# Patient Record
Sex: MALE | Race: BLACK OR AFRICAN AMERICAN | NOT HISPANIC OR LATINO | URBAN - METROPOLITAN AREA
[De-identification: names, ages, dates, MRNs, and addresses within clinical notes are randomized per-mention and may not be internally consistent; named-entity substitution may affect disease eponyms.]

---

## 2019-01-11 ENCOUNTER — AMBULATORY - HEALTHEAST (OUTPATIENT)
Dept: ADMINISTRATIVE | Facility: CLINIC | Age: 59
End: 2019-01-11

## 2019-01-11 RX ORDER — BACLOFEN 20 MG/1
20 TABLET ORAL 3 TIMES DAILY
Status: SHIPPED | COMMUNITY
Start: 2019-01-11

## 2019-01-11 RX ORDER — CITALOPRAM HYDROBROMIDE 10 MG/1
10 TABLET ORAL DAILY
Status: SHIPPED | COMMUNITY
Start: 2019-01-11

## 2019-01-11 RX ORDER — BROMOCRIPTINE MESYLATE 2.5 MG/1
2.5 TABLET ORAL 2 TIMES DAILY
Status: SHIPPED | COMMUNITY
Start: 2019-01-11

## 2019-01-11 RX ORDER — ASPIRIN 81 MG/1
81 TABLET, CHEWABLE ORAL DAILY
Status: SHIPPED | COMMUNITY
Start: 2019-01-11

## 2019-01-11 RX ORDER — ATORVASTATIN CALCIUM 40 MG/1
40 TABLET, FILM COATED ORAL AT BEDTIME
Status: SHIPPED | COMMUNITY
Start: 2019-01-11

## 2019-01-11 RX ORDER — DONEPEZIL HYDROCHLORIDE 5 MG/1
5 TABLET, FILM COATED ORAL DAILY
Status: SHIPPED | COMMUNITY
Start: 2019-01-11

## 2019-01-15 ENCOUNTER — OFFICE VISIT - HEALTHEAST (OUTPATIENT)
Dept: GERIATRICS | Facility: CLINIC | Age: 59
End: 2019-01-15

## 2019-01-15 DIAGNOSIS — I62.9 INTRACRANIAL HEMORRHAGE (H): ICD-10-CM

## 2019-01-15 DIAGNOSIS — R53.81 PHYSICAL DECONDITIONING: ICD-10-CM

## 2019-01-15 DIAGNOSIS — R13.10 DYSPHAGIA, UNSPECIFIED TYPE: ICD-10-CM

## 2019-01-15 DIAGNOSIS — R57.0 CARDIOGENIC SHOCK (H): ICD-10-CM

## 2019-01-15 DIAGNOSIS — R47.01 APHASIA: ICD-10-CM

## 2019-01-15 DIAGNOSIS — Z71.89 ADVANCED CARE PLANNING/COUNSELING DISCUSSION: ICD-10-CM

## 2019-01-15 DIAGNOSIS — G81.94 LEFT HEMIPARESIS (H): ICD-10-CM

## 2019-01-15 DIAGNOSIS — I63.9 CEREBROVASCULAR ACCIDENT (CVA), UNSPECIFIED MECHANISM (H): ICD-10-CM

## 2019-01-17 ENCOUNTER — OFFICE VISIT - HEALTHEAST (OUTPATIENT)
Dept: GERIATRICS | Facility: CLINIC | Age: 59
End: 2019-01-17

## 2019-01-17 DIAGNOSIS — I62.9 INTRACRANIAL HEMORRHAGE (H): ICD-10-CM

## 2019-01-17 DIAGNOSIS — G81.94 LEFT HEMIPARESIS (H): ICD-10-CM

## 2019-01-17 DIAGNOSIS — R57.0 CARDIOGENIC SHOCK (H): ICD-10-CM

## 2019-01-17 DIAGNOSIS — I25.10 CORONARY ARTERY DISEASE INVOLVING NATIVE HEART, ANGINA PRESENCE UNSPECIFIED, UNSPECIFIED VESSEL OR LESION TYPE: ICD-10-CM

## 2019-01-17 DIAGNOSIS — R53.81 PHYSICAL DECONDITIONING: ICD-10-CM

## 2019-01-17 DIAGNOSIS — R13.10 DYSPHAGIA, UNSPECIFIED TYPE: ICD-10-CM

## 2019-01-17 DIAGNOSIS — I63.9 CEREBROVASCULAR ACCIDENT (CVA), UNSPECIFIED MECHANISM (H): ICD-10-CM

## 2019-01-17 DIAGNOSIS — R47.01 APHASIA: ICD-10-CM

## 2019-01-21 ENCOUNTER — OFFICE VISIT - HEALTHEAST (OUTPATIENT)
Dept: GERIATRICS | Facility: CLINIC | Age: 59
End: 2019-01-21

## 2019-01-21 DIAGNOSIS — I62.9 INTRACRANIAL HEMORRHAGE (H): ICD-10-CM

## 2019-01-21 DIAGNOSIS — R53.81 PHYSICAL DECONDITIONING: ICD-10-CM

## 2019-01-21 DIAGNOSIS — R47.01 APHASIA: ICD-10-CM

## 2019-01-21 DIAGNOSIS — I63.9 CEREBROVASCULAR ACCIDENT (CVA), UNSPECIFIED MECHANISM (H): ICD-10-CM

## 2019-01-21 DIAGNOSIS — R13.10 DYSPHAGIA, UNSPECIFIED TYPE: ICD-10-CM

## 2019-01-21 DIAGNOSIS — G81.94 LEFT HEMIPARESIS (H): ICD-10-CM

## 2019-01-25 ENCOUNTER — OFFICE VISIT - HEALTHEAST (OUTPATIENT)
Dept: GERIATRICS | Facility: CLINIC | Age: 59
End: 2019-01-25

## 2019-01-25 DIAGNOSIS — I63.9 CEREBROVASCULAR ACCIDENT (CVA), UNSPECIFIED MECHANISM (H): ICD-10-CM

## 2019-01-25 DIAGNOSIS — G81.94 LEFT HEMIPARESIS (H): ICD-10-CM

## 2019-01-25 DIAGNOSIS — R47.01 APHASIA: ICD-10-CM

## 2019-01-25 DIAGNOSIS — R13.10 DYSPHAGIA, UNSPECIFIED TYPE: ICD-10-CM

## 2019-01-25 DIAGNOSIS — I62.9 INTRACRANIAL HEMORRHAGE (H): ICD-10-CM

## 2019-01-25 DIAGNOSIS — R53.81 PHYSICAL DECONDITIONING: ICD-10-CM

## 2019-02-01 ENCOUNTER — OFFICE VISIT - HEALTHEAST (OUTPATIENT)
Dept: GERIATRICS | Facility: CLINIC | Age: 59
End: 2019-02-01

## 2019-02-01 DIAGNOSIS — R47.01 APHASIA: ICD-10-CM

## 2019-02-01 DIAGNOSIS — R13.10 DYSPHAGIA, UNSPECIFIED TYPE: ICD-10-CM

## 2019-02-01 DIAGNOSIS — I63.9 CEREBROVASCULAR ACCIDENT (CVA), UNSPECIFIED MECHANISM (H): ICD-10-CM

## 2019-02-01 DIAGNOSIS — I62.9 INTRACRANIAL HEMORRHAGE (H): ICD-10-CM

## 2019-02-01 DIAGNOSIS — G81.94 LEFT HEMIPARESIS (H): ICD-10-CM

## 2019-02-01 DIAGNOSIS — R53.81 PHYSICAL DECONDITIONING: ICD-10-CM

## 2019-02-06 ENCOUNTER — OFFICE VISIT - HEALTHEAST (OUTPATIENT)
Dept: GERIATRICS | Facility: CLINIC | Age: 59
End: 2019-02-06

## 2019-02-06 DIAGNOSIS — I63.9 CEREBROVASCULAR ACCIDENT (CVA), UNSPECIFIED MECHANISM (H): ICD-10-CM

## 2019-02-06 DIAGNOSIS — R47.01 APHASIA: ICD-10-CM

## 2019-02-06 DIAGNOSIS — R13.10 DYSPHAGIA, UNSPECIFIED TYPE: ICD-10-CM

## 2019-02-06 DIAGNOSIS — R53.81 PHYSICAL DECONDITIONING: ICD-10-CM

## 2019-02-06 DIAGNOSIS — G81.94 LEFT HEMIPARESIS (H): ICD-10-CM

## 2019-02-11 ENCOUNTER — OFFICE VISIT - HEALTHEAST (OUTPATIENT)
Dept: GERIATRICS | Facility: CLINIC | Age: 59
End: 2019-02-11

## 2019-02-11 DIAGNOSIS — R53.81 PHYSICAL DECONDITIONING: ICD-10-CM

## 2019-02-11 DIAGNOSIS — I63.9 CEREBROVASCULAR ACCIDENT (CVA), UNSPECIFIED MECHANISM (H): ICD-10-CM

## 2019-02-11 DIAGNOSIS — R13.10 DYSPHAGIA, UNSPECIFIED TYPE: ICD-10-CM

## 2019-02-11 DIAGNOSIS — I62.9 INTRACRANIAL HEMORRHAGE (H): ICD-10-CM

## 2019-02-11 DIAGNOSIS — G81.94 LEFT HEMIPARESIS (H): ICD-10-CM

## 2019-02-11 DIAGNOSIS — R47.01 APHASIA: ICD-10-CM

## 2019-02-22 ENCOUNTER — OFFICE VISIT - HEALTHEAST (OUTPATIENT)
Dept: GERIATRICS | Facility: CLINIC | Age: 59
End: 2019-02-22

## 2019-02-22 DIAGNOSIS — R13.10 DYSPHAGIA, UNSPECIFIED TYPE: ICD-10-CM

## 2019-02-22 DIAGNOSIS — R47.01 APHASIA: ICD-10-CM

## 2019-02-22 DIAGNOSIS — G81.94 LEFT HEMIPARESIS (H): ICD-10-CM

## 2019-02-22 DIAGNOSIS — I62.9 INTRACRANIAL HEMORRHAGE (H): ICD-10-CM

## 2019-02-22 DIAGNOSIS — R53.81 PHYSICAL DECONDITIONING: ICD-10-CM

## 2019-02-22 DIAGNOSIS — I63.9 CEREBROVASCULAR ACCIDENT (CVA), UNSPECIFIED MECHANISM (H): ICD-10-CM

## 2019-02-27 ENCOUNTER — OFFICE VISIT - HEALTHEAST (OUTPATIENT)
Dept: GERIATRICS | Facility: CLINIC | Age: 59
End: 2019-02-27

## 2019-02-27 DIAGNOSIS — I62.9 INTRACRANIAL HEMORRHAGE (H): ICD-10-CM

## 2019-02-27 DIAGNOSIS — R53.81 PHYSICAL DECONDITIONING: ICD-10-CM

## 2019-02-27 DIAGNOSIS — R13.10 DYSPHAGIA, UNSPECIFIED TYPE: ICD-10-CM

## 2019-02-27 DIAGNOSIS — G81.94 LEFT HEMIPARESIS (H): ICD-10-CM

## 2019-03-05 ENCOUNTER — OFFICE VISIT - HEALTHEAST (OUTPATIENT)
Dept: GERIATRICS | Facility: CLINIC | Age: 59
End: 2019-03-05

## 2019-03-05 DIAGNOSIS — G81.94 LEFT HEMIPARESIS (H): ICD-10-CM

## 2019-03-05 DIAGNOSIS — R47.01 APHASIA: ICD-10-CM

## 2019-03-05 DIAGNOSIS — I63.9 CEREBROVASCULAR ACCIDENT (CVA), UNSPECIFIED MECHANISM (H): ICD-10-CM

## 2019-03-05 DIAGNOSIS — R13.10 DYSPHAGIA, UNSPECIFIED TYPE: ICD-10-CM

## 2019-03-12 ENCOUNTER — OFFICE VISIT - HEALTHEAST (OUTPATIENT)
Dept: GERIATRICS | Facility: CLINIC | Age: 59
End: 2019-03-12

## 2019-03-12 DIAGNOSIS — R53.81 PHYSICAL DECONDITIONING: ICD-10-CM

## 2019-03-12 DIAGNOSIS — G81.94 LEFT HEMIPARESIS (H): ICD-10-CM

## 2019-03-12 DIAGNOSIS — R47.01 APHASIA: ICD-10-CM

## 2019-03-12 DIAGNOSIS — I63.9 CEREBROVASCULAR ACCIDENT (CVA), UNSPECIFIED MECHANISM (H): ICD-10-CM

## 2019-03-12 DIAGNOSIS — R13.10 DYSPHAGIA, UNSPECIFIED TYPE: ICD-10-CM

## 2019-03-20 ENCOUNTER — OFFICE VISIT - HEALTHEAST (OUTPATIENT)
Dept: GERIATRICS | Facility: CLINIC | Age: 59
End: 2019-03-20

## 2019-03-20 DIAGNOSIS — I63.9 CEREBROVASCULAR ACCIDENT (CVA), UNSPECIFIED MECHANISM (H): ICD-10-CM

## 2019-03-20 DIAGNOSIS — I62.9 INTRACRANIAL HEMORRHAGE (H): ICD-10-CM

## 2019-03-20 DIAGNOSIS — R47.01 APHASIA: ICD-10-CM

## 2019-03-20 DIAGNOSIS — R53.81 PHYSICAL DECONDITIONING: ICD-10-CM

## 2019-03-20 DIAGNOSIS — R13.10 DYSPHAGIA, UNSPECIFIED TYPE: ICD-10-CM

## 2019-03-20 DIAGNOSIS — G81.94 LEFT HEMIPARESIS (H): ICD-10-CM

## 2019-03-20 DIAGNOSIS — R57.0 CARDIOGENIC SHOCK (H): ICD-10-CM

## 2019-03-25 ENCOUNTER — OFFICE VISIT - HEALTHEAST (OUTPATIENT)
Dept: GERIATRICS | Facility: CLINIC | Age: 59
End: 2019-03-25

## 2019-03-25 DIAGNOSIS — R13.19 OTHER DYSPHAGIA: ICD-10-CM

## 2019-03-25 DIAGNOSIS — I25.119 CORONARY ARTERY DISEASE INVOLVING NATIVE CORONARY ARTERY OF NATIVE HEART WITH ANGINA PECTORIS (H): ICD-10-CM

## 2019-03-25 DIAGNOSIS — G81.94 LEFT HEMIPARESIS (H): ICD-10-CM

## 2019-03-25 DIAGNOSIS — I21.21 STEMI INVOLVING LEFT CIRCUMFLEX CORONARY ARTERY (H): ICD-10-CM

## 2019-03-25 DIAGNOSIS — I63.9 CEREBROVASCULAR ACCIDENT (CVA), UNSPECIFIED MECHANISM (H): ICD-10-CM

## 2019-04-01 ENCOUNTER — OFFICE VISIT - HEALTHEAST (OUTPATIENT)
Dept: GERIATRICS | Facility: CLINIC | Age: 59
End: 2019-04-01

## 2019-04-01 DIAGNOSIS — I21.21 STEMI INVOLVING LEFT CIRCUMFLEX CORONARY ARTERY (H): ICD-10-CM

## 2019-04-01 DIAGNOSIS — R13.19 OTHER DYSPHAGIA: ICD-10-CM

## 2019-04-01 DIAGNOSIS — G81.94 LEFT HEMIPARESIS (H): ICD-10-CM

## 2019-04-01 DIAGNOSIS — I63.9 CEREBROVASCULAR ACCIDENT (CVA), UNSPECIFIED MECHANISM (H): ICD-10-CM

## 2019-04-01 DIAGNOSIS — I25.119 CORONARY ARTERY DISEASE INVOLVING NATIVE CORONARY ARTERY OF NATIVE HEART WITH ANGINA PECTORIS (H): ICD-10-CM

## 2019-04-08 ENCOUNTER — OFFICE VISIT - HEALTHEAST (OUTPATIENT)
Dept: GERIATRICS | Facility: CLINIC | Age: 59
End: 2019-04-08

## 2019-04-08 DIAGNOSIS — I21.21 STEMI INVOLVING LEFT CIRCUMFLEX CORONARY ARTERY (H): ICD-10-CM

## 2019-04-08 DIAGNOSIS — G81.94 LEFT HEMIPARESIS (H): ICD-10-CM

## 2019-04-08 DIAGNOSIS — R13.19 OTHER DYSPHAGIA: ICD-10-CM

## 2019-04-08 DIAGNOSIS — I63.9 CEREBROVASCULAR ACCIDENT (CVA), UNSPECIFIED MECHANISM (H): ICD-10-CM

## 2019-04-13 ENCOUNTER — TRANSFERRED RECORDS (OUTPATIENT)
Dept: HEALTH INFORMATION MANAGEMENT | Facility: CLINIC | Age: 59
End: 2019-04-13

## 2019-04-15 ENCOUNTER — OFFICE VISIT - HEALTHEAST (OUTPATIENT)
Dept: GERIATRICS | Facility: CLINIC | Age: 59
End: 2019-04-15

## 2019-04-15 DIAGNOSIS — I63.9 CEREBROVASCULAR ACCIDENT (CVA), UNSPECIFIED MECHANISM (H): ICD-10-CM

## 2019-04-15 DIAGNOSIS — I21.21 STEMI INVOLVING LEFT CIRCUMFLEX CORONARY ARTERY (H): ICD-10-CM

## 2019-04-15 DIAGNOSIS — I25.119 CORONARY ARTERY DISEASE INVOLVING NATIVE CORONARY ARTERY OF NATIVE HEART WITH ANGINA PECTORIS (H): ICD-10-CM

## 2019-04-15 DIAGNOSIS — R53.81 PHYSICAL DECONDITIONING: ICD-10-CM

## 2019-04-15 DIAGNOSIS — R47.01 APHASIA: ICD-10-CM

## 2019-04-15 DIAGNOSIS — R56.9 SEIZURE (H): ICD-10-CM

## 2019-04-15 DIAGNOSIS — G81.94 LEFT HEMIPARESIS (H): ICD-10-CM

## 2019-04-22 ENCOUNTER — AMBULATORY - HEALTHEAST (OUTPATIENT)
Dept: GERIATRICS | Facility: CLINIC | Age: 59
End: 2019-04-22

## 2021-05-27 NOTE — PROGRESS NOTES
[facility]    Facility:   [facility] [[id number]]     Code Status: FULL CODE   [facility] 4/13 through 4/14/19      CHIEF COMPLAINT/REASON FOR VISIT:  Chief Complaint   Patient presents with     H & P     readmission after possible seizure   Ayo had been in rehabilitation at [facility], following a new stroke, dense left hemiparesis which followed ST elevated MI and drug-eluting stent placement..  Had been on keppra but stopped in Novemeber upon dc from [facility] 2/2 no seizure activity.     Staff at the TCU noted some tonic clonic activity, he was sent to [facility] for evaluation.  Was seen by Dr. Swenson of [place] epilepsy: She recommended Keppra to 50 mg twice daily  EEG monitoring: no seizures but abnormal background    He was sent back to [facility], which coincided with his plan imminent discharge out of the facility, with family providing the care, durable medical equipment ordered previous to his brief admission at [place].    NOTES FROM HISTORY of ADMISSION TO [place]:      HPI: Osvaldo is a 58 y.o. male with a history of stroke with left hemiparesis, intracranial hemorrhage, aphasia, dysphasia, acute respiratory failure, coronary artery disease with cardiogenic shock,.    He had had a stroke in 2011 with residual deficits, infero-lateral STEMI November 2018 with PCI x2, intra-aortic balloon pump for cardiogenic shock, and then a stroke during the cardiac procedure.  He had a thrombectomy of the left middle cerebral artery.  The had subsequent right middle cerebral artery stroke dense left hemiplegia.    He was at [facility].  He had some improvement in right upper extremity function, has some ability to use a spelling board, and handwriting although it is not particularly legible.  N.p.o. with feedings per tube    Transferred to [facility]. Per Nursing and Social Service, family members wants to take him home and will  take turns being with him.    Past Medical History:   Diagnosis Date     Acute respiratory failure with hypoxia (H)      Aphasia      Aspiration pneumonia (H)      CAD (coronary artery disease)      Cardiogenic shock (H)      CVA (cerebral vascular accident) (H)      Dysphagia      HLD (hyperlipidemia)      Hyperglycemia      Hypotension      Intracranial hemorrhage (H)      Late effects of cerebral ischemic stroke      Left hemiparesis (H)      Stroke (H)                    Review of Systems   Nonverbal, did smile.  No attempt at gestures, did write on the white board but did not answers questions; kept writing his age and birth year    Blood pressure 98/73, pulse 71, temperature 97.2  F (36.2  C), resp. rate 16, SpO2 97 %.        Physical Exam  Constitutional: alert, middle-aged -American male nonverbal  HEENT: normal  Lungs:  clear to auscultation bilaterally,   Heart: S1S2 regular rate and rhythm, no murmur  Abdomen: soft, non-tender; bowel sounds normal; no masses,  no organomegaly, g-tube in place, site without inflammation or discharge   Extremities: extremities normal, brace on left arm, no  Edema. No spontaneous movement left side extremities  Skin:  No rashes or lesions  Neurologic: nonverbal, left sided hemiplegia, aphasia  Psych: pleasant     Transfers with max assist of 2, maximum assistance in all ADLs.      LABS:   04/13/19 1944   WBC 12.7 H   HGB 14.4   MCV 87      INR 1.0   Recent Labs   04/13/19 1944   SODIUM 139   POTASSIUM 4.3   OC1ADZPM 24   BUN 15   CREATININE 0.74   GLUCOSE 89         Head CT:  1. No acute intracranial abnormality.        ASSESSMENT / PLAN :        ICD-10-CM    1. Seizure (H) R56.9 On Keppra, will need LFT, BMP, CBC monitoring.   2. Cerebrovascular accident (CVA), unspecified mechanism (H): lifetime 3 CVAs I63.9 Total Care: sister feels she can take care of him with help from other family members, hospital bed, wheelchair, transfer disc per her requests.. On   Parlodel, donepezil for . Baclofen for spasm   3. Coronary artery disease involving native coronary artery of native heart with angina pectoris (H) I25.119 No indication of angina   4. STEMI involving left circumflex coronary artery (H): TESSA I21.21 On Brilinta, ASA   5. Left hemiparesis (H) G81.94 plateaued in thrapy.   6. Dysphasia R13.0 Nutrition per PEG tube with Isosource 1.5 PHILIPPE at 56 mL/hr continuous feedings   7. Physical deconditioning R53.81 Poor condition; requires assist of 2     Discharge face to face:  4/15/19  These are the necessary home care services and their rationale:  1.  Home health care nurse: His stroke left him with severe dysphasia, he is tube feed dependent.  Nurse for teaching and management  2.  Home health aide: He is dependent in all ADLs, they can provide personal cares and bath aid  3.  Home physical therapy: He has left hemiplegia and requires the assistance of 2.  Treat to advance functional mobility as well as fall risk assessment and home safety evaluation.  4.  Home occupational therapy, again he has both cognitive and physical challenges with a total of 3 strokes, need to treat to advance ADL mobility to his maximum potential.  Home speech therapy: Treat to advance swallowing as he has severe dysphasia  : To assist with community resources.    Special instructions: 1.   clean the G-tube site daily with warm  soapy water, pat dry and cover with gauze.  2.  Elbow splint on in the morning for 12 hours and remove and evening/bedtime: To treat and prevent further arm contracture 3.  Left hand splint on at bedtime for 12 hours, remove in the morning for treatment and prevention of further hand contracture.    He is to follow-up with his PCP within 5 days of discharge: Recommend that CBC and basic metabolic profile be checked, for when it safe to restart his beta-blocker, ACE inhibitor, and statin.    Time 40 minutes: Reviewing most recent hospitalization, exam, and  preparing discharge orders consisting of med reconciliation, and aftercare services.  Electronically signed by: Lesley Schwarz MD

## 2021-05-27 NOTE — PROGRESS NOTES
[facility]    Facility:   [facility] [[id number]]     Code Status: FULL CODE      CHIEF COMPLAINT/REASON FOR VISIT:  Chief Complaint   Patient presents with     Review Of Multiple Medical Conditions     CVA       HISTORY:      HPI: Osvaldo is a 58 y.o. male with a history of stroke with left hemiparesis, intracranial hemorrhage, aphasia, dysphasia, acute respiratory failure, coronary artery disease with cardiogenic shock,.    He had had a stroke in 2011 with residual deficits, infero-lateral STEMI November 2018 with PCI x2, intra-aortic balloon pump for cardiogenic shock, and then a stroke during the cardiac procedure.  He had a thrombectomy of the left middle cerebral artery.  The had subsequent right middle cerebral artery stroke dense left hemiplegia.    He was at [facility].  He had some improvement in right upper extremity function, has some ability to use a spelling board, and handwriting although it is not particularly legible.  N.p.o. with feedings per tube    Transferred to [facility]. Per Nursing and Social Service, family members wants to take him home and will take turns being with him.    Past Medical History:   Diagnosis Date     Acute respiratory failure with hypoxia (H)      Aphasia      Aspiration pneumonia (H)      CAD (coronary artery disease)      Cardiogenic shock (H)      CVA (cerebral vascular accident) (H)      Dysphagia      HLD (hyperlipidemia)      Hyperglycemia      Hypotension      Intracranial hemorrhage (H)      Late effects of cerebral ischemic stroke      Left hemiparesis (H)      Stroke (H)                    Review of Systems   Nonverbal, did smile.  No attempt at gestures, no use of word board    Blood pressure 141/80, pulse 80, temperature 97.6  F (36.4  C), resp. rate 18, SpO2 97 %.      Physical Exam  Constitutional: alert, middle-aged -American male nonverbal  Membranes moist  Lungs: poor inspiratory effort,  clear to auscultation bilaterally,   Heart: S1S2 regular rate and rhythm  Abdomen: soft, non-tender; bowel sounds normal; no masses,  no organomegaly, g-tube in place, site without inflammation or discharge   Extremities: extremities normal, brace on left arm, no  edema  Skin:  No rashes or lesions  Neurologic: nonverbal, left sided hemiplegia, aphasia  Psych: pleasant    Per therapy update: Transfers with max assist of 2, maximum assistance in all ADLs.  He has been more resistant lately to the therapy.  He is not making significant progress.  100 th day will be on April 10    LABS:   No new labs    ASSESSMENT / PLAN :      ICD-10-CM    1. Cerebrovascular accident (CVA), unspecified mechanism (H) ( three CVAs) I63.9 Family may try caring but he is heavy care, totally dependent for all ADLS; would usually expect LTC placement. Safe dc plan must be assured.   2. Coronary artery disease involving native coronary artery of native heart with angina pectoris (H) I25.119 Not showing signs symptoms of ischemia, continue to monitor   3. STEMI involving left circumflex coronary artery (H): TESSA I21.21    4. Left hemiparesis G81.94 Will likely  not recover use of left side   5 Other dysphagia: NPO, feeding tube R13.19 Working well.         Electronically signed by: Lesley Schwarz MD

## 2021-05-27 NOTE — PROGRESS NOTES
[facility]    Facility:   [facility] [[id number]]     Code Status: FULL CODE      CHIEF COMPLAINT/REASON FOR VISIT:  Chief Complaint   Patient presents with     Review Of Multiple Medical Conditions     CVA / face to face for wheelchair and hoapital bed       HISTORY:      HPI: Osvaldo is a 58 y.o. male with a history of stroke with left hemiparesis, intracranial hemorrhage, aphasia, dysphasia, acute respiratory failure, coronary artery disease with cardiogenic shock,.    He had had a stroke in 2011 with residual deficits, infero-lateral STEMI November 2018 with PCI x2, intra-aortic balloon pump for cardiogenic shock, and then a stroke during the cardiac procedure.  He had a thrombectomy of the left middle cerebral artery.  The had subsequent right middle cerebral artery stroke dense left hemiplegia.    He was at [facility].  He had some improvement in right upper extremity function, has some ability to write on a white board.N.p.o. with feedings per tube    Transferred to [facility]. Per Nursing and Social Service, family members wants to take him home and will take turns being with him.    UPDATE:  Therapy has been working with him to roll side-to-side in the bed.  On 3/31 on the evening shift, he rolled his head and torso out of bed.  He was found with his head on the bed, his legs were still on the mattress.  He stated he was trying to rollover.  His sister was upset, wanted to know how it happened.  I discussed with her that when we order the hospital bed we could order more of a railing than the cane rail used in TCU.    Face-to-face is done today for an electric hospital bed, and a standard manual wheelchair (see below).  Sister is upset that therapy says he does not need a Giudi chair, he intends to call [employer] his insurance company herself to lobby for getting them to pay for one.  We informed her that we can order equipment that is not justified,  that it would constitute fraud.      Past Medical History:   Diagnosis Date     Acute respiratory failure with hypoxia (H)      Aphasia      Aspiration pneumonia (H)      CAD (coronary artery disease)      Cardiogenic shock (H)      CVA (cerebral vascular accident) (H)      Dysphagia      HLD (hyperlipidemia)      Hyperglycemia      Hypotension      Intracranial hemorrhage (H)      Late effects of cerebral ischemic stroke      Left hemiparesis (H)      Stroke (H)                    Review of Systems   Nonverbal, did smile.    Did very well writing on the white board today  He was less engaged when his sister came and was upset about the DME and fall    Blood pressure 96/66, pulse 61, temperature 96.6  F (35.9  C), resp. rate 16, SpO2 95 %.          Physical Exam  Constitutional: alert, middle-aged -American male nonverbal  Membranes moist  Lungs: poor inspiratory effort, clear to auscultation bilaterally,   Heart: S1S2 regular rate and rhythm  Abdomen: soft, non-tender; bowel sounds normal; no masses,  no organomegaly, g-tube in place, site without inflammation or discharge   Extremities: extremities normal, brace on left arm, no  edema  Skin:  No rashes or lesions  Neurologic: nonverbal, left sided hemiplegia, aphasia, drooling. Articulated brace on left arm  Psych: pleasant    Per therapy update: Transfers with max assist of 2, maximum assistance in all ADLs.  He has been more resistant lately to the therapy.  He is not making significant progress.  100 th day will be on April 10    LABS:   No new labs    ASSESSMENT / PLAN :      ICD-10-CM    1. Cerebrovascular accident (CVA), unspecified mechanism (H) ( three CVAs) I63.9 Family may try caring but he is heavy care, totally dependent for all ADLS; would usually expect LTC placement. Safe dc plan must be assured.Aricept, Parlodel   2. Coronary artery disease involving native coronary artery of native heart with angina pectoris (H) I25.119 Not showing signs  symptoms of ischemia, continue to monitor   3. STEMI involving left circumflex coronary artery (H): TESSA I21.21 ASA, Lipitor, Brilinta (aslso for CVAs)   4. Left hemiparesis G81.94 Will likely  not recover use of left side. Baclofen for spasm.   5 Other dysphagia: NPO, feeding tube R13.19 Working well.       FACE TO FACE  4/1/2019    [facility] BED  Diagnosis: CVA with left hemiparesis, weakness, aspiration pneumonia  Height 72 inches, weight 136.6 pounds  1.  Patient has a medical condition that requires positioning not feasible with an ordinary bed.  2.  He requires positioning of the body to prevent pain in ways not feasible with an ordinary bed.  3.  Patient requires a head of the bed to be elevated more than 30 degrees due to aspiration problems and tube feedings.  Cannot hold his trunk on pillows or wedges.  4: He does not require traction equipment  5.  He requires a bed height different than fixed height hospital bed for transfers,  6.  He requires frequent body changes to prevent pressure sores.  7.  He needs halflength siderails, as he is rolled himself out of bed.  Estimated length of need: Lifetime (99 months).    FACE TO FACE    4/1/2019  Standard manual wheel chair    Diagnosis: CVA with left hemiparesis, weakness  Height 72 inches, weight 136.6 pounds  Osvaldo will use the wheelchair 8+ hours per day, all times when out of bed  2.  Wheelchair will be used within his home  3.  He can propel the chair with his right arm and leg  4.  He does not have skin breakdown but is at risk for breakdown due to immobility.  Pressure relief cushion will be ordered  5.  He is not receiving any advanced wound healing therapy.    Wheelchair specifics: 18 inch wide, 18 inch depth, full armrests with nylon armrest bolster on the left side, chair height high position, elevating leg rests, full reclining high back with head rest extension, rear anti-tippers.  Equal gel pressure relief cushion or similar (18 inch x 18  inch)    Wheelchair prescription:  1: Patient has mobility restrictions that significantly impair his ability to participate in MR ADLs such as toileting, feeding, dressing, grooming, and bathing.  2: His mobility limitations cannot be resolved by the use of a fitted cane or walker  3 use of a manual wheelchair will significantly improve his ability to participate in MR ADLs and he will use it on a regular basis at home.  4: He is nonverbal, so cannot express willingness to use the manual wheelchair in the home.  Family caretakers needed and he is able to use it  5: He has upper extremity function and capabilities on the right side with supervision  6: He has a caregiver available, willing, and able to provide assistance  7: We are not ordering mesfin-wheelchair 8.  We are not ordering a light weight wheelchair 9.  We are not ordering a high strength lightweight wheelchair.  Estimated length of need lifetime (99 months)      He also requires of pivot transfer disc 15 inch    Total time 40 minutes, > 50 % with pt and sister explaining DME orders, regarding fall. Also including 10 minutes with Social Service and sister regarding sister's request for DME that is not indicated by therapy, and discussion with unit RN manager about the DME issues.    Electronically signed by: Lesley Schwarz MD   NPI: [id number]

## 2021-06-02 VITALS — WEIGHT: 135.2 LBS

## 2021-06-02 VITALS — WEIGHT: 137 LBS

## 2021-06-02 VITALS — WEIGHT: 136 LBS

## 2021-06-02 VITALS — WEIGHT: 135.8 LBS

## 2021-06-02 VITALS — WEIGHT: 134.4 LBS

## 2021-06-02 VITALS — WEIGHT: 136.6 LBS

## 2021-06-02 VITALS — WEIGHT: 138.2 LBS

## 2021-06-02 VITALS — WEIGHT: 134.2 LBS

## 2021-06-16 PROBLEM — I21.21 STEMI INVOLVING LEFT CIRCUMFLEX CORONARY ARTERY (H): Status: ACTIVE | Noted: 2018-11-21

## 2021-06-16 PROBLEM — R47.01 APHASIA: Status: ACTIVE | Noted: 2019-01-17

## 2021-06-16 PROBLEM — Z71.89 ADVANCED CARE PLANNING/COUNSELING DISCUSSION: Status: ACTIVE | Noted: 2019-01-17

## 2021-06-16 PROBLEM — I62.9 INTRACRANIAL HEMORRHAGE (H): Status: ACTIVE | Noted: 2019-01-17

## 2021-06-16 PROBLEM — I25.119 CORONARY ARTERY DISEASE INVOLVING NATIVE CORONARY ARTERY OF NATIVE HEART WITH ANGINA PECTORIS (H): Status: ACTIVE | Noted: 2019-03-27

## 2021-06-16 PROBLEM — R13.10 DYSPHAGIA: Status: ACTIVE | Noted: 2019-01-17

## 2021-06-16 PROBLEM — I63.9 CVA (CEREBRAL VASCULAR ACCIDENT) (H): Status: ACTIVE | Noted: 2019-01-17

## 2021-06-16 PROBLEM — R53.81 PHYSICAL DECONDITIONING: Status: ACTIVE | Noted: 2019-01-17

## 2021-06-16 PROBLEM — G81.94 LEFT HEMIPARESIS (H): Status: ACTIVE | Noted: 2019-01-17

## 2021-06-16 PROBLEM — R57.0 CARDIOGENIC SHOCK (H): Status: ACTIVE | Noted: 2019-01-17

## 2021-06-18 NOTE — LETTER
"Letter by Heather Aj CNP at      Author: Heather Aj CNP Service: -- Author Type: --    Filed:  Encounter Date: 1/15/2019 Status: (Other)         Patient: Osvaldo Rollins   MR Number: [id number]   YOB: 1960   Date of Visit: 1/15/2019     [facility]    Facility:   [facility] [[id number]]   Code Status: FULL CODE and POLST AVAILABLE      CHIEF COMPLAINT/REASON FOR VISIT:  Chief Complaint   Patient presents with   ? Review Of Multiple Medical Conditions     physical deconditioning, CVA, intracranial hemorrhage, left sided deficitis, aphasia, dysphagia       HISTORY:      HPI: Osvaldo is a 58 y.o. male with complex past medical history including CVA, intracranial hemorrhage, aphasia, dysphagia, acute respiratory failure with hypoxia, CAD, cardiogenic shock, left hemiparesis (other's outlined in pmh table) presenting to [facility] for rehabilitation s/p CVA. He was hospitalized at [facility] on 11/1/2018 for a STEMI and L MCA occlusion, he was discharged on 11/21/2018 and went to [facility] where he was to undergo rehabilitative therapies. Discharging provider from [facility] summarized hospitalization in previous notes.     Summary from [facility] discharging provider  is as follows:   \"58 y.o. patient was admitted to [facility] on 11/21/2018 as a transfer from [facility] for continuation of respiratory and overall rehabilitation after strokes.           Patient has h/o frontal stroke in 2011 with residual deficits; he  initially presented to [facility] on 11/1/18 after suffering chest pain. He was found to have had an acute inferoposterior ST-elevation myocardial infarction.            He was subsequently transferred to [facility] for cardiac catheterization. There, he underwent PCI x2 (TESSA to proximal circumflex and to 1st marginal relieving 100% stenosis in both) and had an intra-aortic balloon pump placed for " cardiogenic shock.            While on the table, he suffered an acute stroke at which point he was transferred to the Neurointerventional suite for emergent thrombectomy of the L MCA. He was then sent to the ICU with the IABP.            He was extubated 2 days later.            However, he then suddenly lost consciousness. It was initially thought that this was due to failure of extubation so he was re-intubated. Later, it was determined that he had new neurologic deficits. A repeat CT of the brain showed a new R MCA stroke. Thrombectomy was not indicated in this situation.            He subsequently developed pulmonary edema and underwent tracheostomy on 11/14.            His neurologic status remained poor in that he would only awaken, open his eyes but not track, could not follow commands, and was able to spontaneously move his right side but almost no movement of the left side. His course was complicated by aspiration pneumonia which was treated with antibiotics but he continued to have fevers on/off despite antibiotics and despite no other source of infection being found.          PEG was placed on 11/19/2018.         He was placed on Keppra for seizure prophylaxis, has since been D/C'd with no evidence of seizures         He was not placed on beta-blockers or ACE inhibitors post-stroke and post-MI because of hypotension. In fact, his neurological symptoms apparently worsened if he became hypotensive so Midodrine was started and neurology at [facility], has since been weaned off with stable BP.  Statin was discontinued at [facility] due to increasing LFT's, LFTs improved after discontinuation.          His LVEF was 45%.           He is on donepezil and Celexa as well, per Neurology at [facility].      Problems at the time of transfer to [facility]:        1. 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  "Acute inferolateral NSTEMI s/p TESSA to L circumflex and D1 s/p cardiogenic shock with IABP now resolved        3. Acute hypoxic respiratory failure s/p tracheostomy on ventilator        4. 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 Hypotension requiring pharmacologic support        6. Dysphagia s/p PEG on tube feeds with NPO status        7. Hyperglycemia related to tube feeds        8. Recent aspiration pneumonia s/p antibiotic treatment with persistent, intermittent fevers        9. Hyperlipidemia      10. Tobacco use disorder      11. Alcohol use disorder    Has made some progress since transfer to [facility] on 11/21/18. Has had improvement to function of his RUE, is able to communicate via handwriting. Was initially on ventilator support, but gradually improved; eventually decannulated 12/18/18.\"    Today Mr. Rollins is being evaluated for a routine review of multiple medical problems while in the TCU, as well as an intake into the TCU. Upon first visit he is alone in his room, he is nonverbal. He does have a notebook and is attempting to write-- however, it is unclear what he is trying to say. He will start to draw circles in the middle of the book. Writing is not legible. This is normal per nursing staff. Nursing staff did say that he has been doing well since having an episode of emesis two days ago (tube feeding was held for a short period and then resumed at a lower rate). No further episodes of emesis or any other concerns. He has been slightly warm but temperature at visit was 99.4. Sister Lee Ann does come in the middle of visit where an indepth discussion of goals of care and advanced care planning ensues. Did discuss that the family's goal is to get him to a point where they can take him home and care for Osvaldo. They feel he has not had enough therapy and would like him to have as much therapy as possible. Sister Lee Ann is a good advocate for her brother and shares " she will be to the facility daily. She would like to see as much improvement as possible. Mr. Rollins remains nonverbal during the entire visit. Does not follow commands. He has been working with PT/OT routinely. Unable to do an ROS with patient due to condition, sister and nursing staff do not have any new concerns or issues to report.         Past Medical History:   Diagnosis Date   ? Acute respiratory failure with hypoxia (H)    ? Aphasia    ? Aspiration pneumonia (H)    ? CAD (coronary artery disease)    ? Cardiogenic shock (H)    ? CVA (cerebral vascular accident) (H)    ? Dysphagia    ? HLD (hyperlipidemia)    ? Hyperglycemia    ? Hypotension    ? Intracranial hemorrhage (H)    ? Late effects of cerebral ischemic stroke    ? Left hemiparesis (H)    ? Stroke (H)              Family History   Problem Relation Age of Onset   ? Deep vein thrombosis Brother    ? Stroke Maternal Grandfather      Social History     Socioeconomic History   ? Marital status: Single     Spouse name: None   ? Number of children: None   ? Years of education: None   ? Highest education level: None   Social Needs   ? Financial resource strain: None   ? Food insecurity - worry: None   ? Food insecurity - inability: None   ? Transportation needs - medical: None   ? Transportation needs - non-medical: None   Occupational History   ? None   Tobacco Use   ? Smoking status: Never Smoker   ? Smokeless tobacco: Never Used   Substance and Sexual Activity   ? Alcohol use: Yes   ? Drug use: None   ? Sexual activity: None   Other Topics Concern   ? None   Social History Narrative   ? None     Current Outpatient Medications   Medication Sig   ? acetaminophen (TYLENOL) 160 mg/5 mL solution 650 mg by G-tube route every 4 (four) hours as needed for fever or pain.          ? aspirin 81 mg chewable tablet 81 mg by G-tube route daily.   ? atorvastatin (LIPITOR) 40 MG tablet 40 mg by G-tube route at bedtime.   ? baclofen (LIORESAL) 20 MG tablet 20 mg by G-tube  route 3 (three) times a day.   ? bromocriptine (PARLODEL) 2.5 mg tablet 2.5 mg by G-tube route 2 (two) times a day.   ? chlorhexidine (PERIDEX) 0.12 % solution Apply 15 mL to the mouth or throat 2 (two) times a day.   ? citalopram (CELEXA) 10 MG tablet 10 mg by G-tube route daily.   ? donepezil (ARICEPT) 5 MG tablet 5 mg by G-tube route daily.   ? emollient base (EMOLLIENT TOP) Apply 2 g topically daily.   ? enoxaparin (LOVENOX) 40 mg/0.4 mL syringe Inject 40 mg under the skin daily.   ? heparin sodium,porcine (HEPARIN, PORCINE,) 5,000 unit/mL injection Inject 5,000 Units under the skin every 8 (eight) hours.   ? insulin glargine (LANTUS) 100 unit/mL injection Inject 8 Units under the skin at bedtime.   ? ipratropium-albuterol (DUO-NEB) 0.5-2.5 mg/3 mL nebulizer Inhale 3 mL 4 (four) times a day as needed.   ? ticagrelor (BRILINTA) 90 mg Tab 90 mg 2 (two) times a day. Via G-tube       REVIEW OF SYSTEM:  Unable to complete due to neurologic status of patient.     PHYSICAL EXAM:   General appearance: alert, appears stated age and cooperative--however nonverbal and does not communicate  HEENT: Head is normocephalic with normal hair distribution. No evidence of trauma. Ears: Without lesions or deformity. No acute purulent discharge. Eyes: Conjunctivae pink with no scleral icterus or erythema. Nose: Normal mucosa and septum. Oropharnyx: mmm, no lesions present.  Lungs: poor inspiratory effort, clear to auscultation bilaterally, respirations without effort  Heart: regular rate and rhythm, S1, S2 normal, no murmur, click, rub or gallop  Abdomen: soft, non-tender; bowel sounds normal; no masses,  no organomegaly, g-tube in place and infusing  Extremities: extremities normal, atraumatic, no cyanosis or edema  Pulses: 2+ and symmetric  Skin: Skin color, texture, turgor normal. No rashes or lesions  Neurologic: nonverbal, left sided hemiplegia, aphasia  Psych: pleasant      LABS:   None today.     ASSESSMENT:      ICD-10-CM     1. Physical deconditioning R53.81    2. Cerebrovascular accident (CVA), unspecified mechanism (H) I63.9    3. Cardiogenic shock (H) R57.0    4. Left hemiparesis (H) G81.94    5. Intracranial hemorrhage (H) I62.9    6. Dysphagia, unspecified type R13.10    7. Aphasia R47.01        PLAN:    Physical Deconditioning due to Cardiogenic Shock, Intracranial Hemorrhage, CVA  -Continue PT/OT and other therapies as per care plan.  -Encouraged good nutrition and movement habits.   -Discussed care plan and expected course of stay.   -Continue to follow-up per routine schedule or sooner if needed.     CVA with Hemiparesis, Dysphagia, Aphasia  -Therapies routinely.   -White board with dry erase marker for communication.   -Communication board.     Dysphagia  -SLP to work with patient--consult, eval & treat.   -Tube feeding, increase to rate of 56 cc/hr continuously.     Advanced Care Planning  -Full Code.   -POLST signed and reviewed.     Admission history and physical per MD per compliance. At this time continue current care plan for all chronic medical conditions, as they are stable. Encouraged patient to engage in PT/OT for strengthening and conditioning. Encouraged patient to work closely with nursing staff to ensure any medical complaints are quickly addressed. Follow up this week or sooner if needed. Will continue to monitor patient and work with nursing staff collaboratively to work toward positive patient outcomes    Total time of 45 minutes spent with patient and nursing staff with greater than 50% of this time spent on review of previous records, counseling, education, and discussion of the above care plan with nursing staff and patient. An additional 20 minutes, from 12:00 pm to 12:20 pm was spent discussing advanced care planning including goals of care and code status. This discussion was held with sister Lee Ann with patient in attendance.     Electronically signed by: Heather Aj CNP

## 2021-06-18 NOTE — LETTER
Letter by Heather Aj CNP at      Author: Heather Aj CNP Service: -- Author Type: --    Filed:  Encounter Date: 1/25/2019 Status: (Other)         Patient: Osvaldo Rollins   MR Number: [id number]   YOB: 1960   Date of Visit: 1/25/2019     [facility]    Facility:   [facility] [[id number]]   Code Status: FULL CODE and POLST AVAILABLE      CHIEF COMPLAINT/REASON FOR VISIT:  Chief Complaint   Patient presents with   ? Review Of Multiple Medical Conditions     physical deconditioning, CVA, HTN, intracerebral hemorrhage       HISTORY:      HPI: Osvaldo is a 58 y.o. male with complex past medical history including CVA, intracranial hemorrhage, aphasia, dysphagia, acute respiratory failure with hypoxia, CAD, cardiogenic shock, left hemiparesis (other's outlined in pmh table) presenting to [facility] for rehabilitation s/p CVA. He was hospitalized at [facility] on 11/1/2018 for a STEMI and L MCA occlusion, he was discharged on 11/21/2018 and went to [facility] where he was to undergo rehabilitative therapies. Discharging provider from [facility] summarized hospitalization in previous notes.  Has made some progress since transfer to [facility] on 11/21/18. Has had improvement to function of his RUE, is able to communicate via handwriting. Was initially on ventilator support, but gradually improved; eventually decannulated 12/18/18.  Hospital stay summarized in previous notes.    Mr. Rollins is being seen today for a review of multiple medical conditions. Osvaldo is resting in his bed. He is again nonverbal but does have a whiteboard which is much easier for him to write on. He does write his name on the whiteboard, as well as his birthday and an address and a phone number. However he is unable to answer any questions asked of him. He does hold my hand and does squeeze it but does not do so on command. Osvaldo does not have any meaningful  communication at this time, however it does seem he is attempting to connect. There are no new concerns from nursing. Sister Lee Ann is not at bedside during this visit.     Past Medical History:   Diagnosis Date   ? Acute respiratory failure with hypoxia (H)    ? Aphasia    ? Aspiration pneumonia (H)    ? CAD (coronary artery disease)    ? Cardiogenic shock (H)    ? CVA (cerebral vascular accident) (H)    ? Dysphagia    ? HLD (hyperlipidemia)    ? Hyperglycemia    ? Hypotension    ? Intracranial hemorrhage (H)    ? Late effects of cerebral ischemic stroke    ? Left hemiparesis (H)    ? Stroke (H)              Family History   Problem Relation Age of Onset   ? Deep vein thrombosis Brother    ? Stroke Maternal Grandfather      Social History     Socioeconomic History   ? Marital status: Single     Spouse name: None   ? Number of children: None   ? Years of education: None   ? Highest education level: None   Social Needs   ? Financial resource strain: None   ? Food insecurity - worry: None   ? Food insecurity - inability: None   ? Transportation needs - medical: None   ? Transportation needs - non-medical: None   Occupational History   ? None   Tobacco Use   ? Smoking status: Never Smoker   ? Smokeless tobacco: Never Used   Substance and Sexual Activity   ? Alcohol use: Yes   ? Drug use: None   ? Sexual activity: None   Other Topics Concern   ? None   Social History Narrative   ? None     Current Outpatient Medications   Medication Sig   ? acetaminophen (TYLENOL) 160 mg/5 mL solution 650 mg by G-tube route every 4 (four) hours as needed for fever or pain.          ? aspirin 81 mg chewable tablet 81 mg by G-tube route daily.   ? atorvastatin (LIPITOR) 40 MG tablet 40 mg by G-tube route at bedtime.   ? baclofen (LIORESAL) 20 MG tablet 20 mg by G-tube route 3 (three) times a day.   ? bromocriptine (PARLODEL) 2.5 mg tablet 2.5 mg by G-tube route 2 (two) times a day.   ? chlorhexidine (PERIDEX) 0.12 % solution Apply 15 mL  to the mouth or throat 2 (two) times a day.   ? citalopram (CELEXA) 10 MG tablet 10 mg by G-tube route daily.   ? donepezil (ARICEPT) 5 MG tablet 5 mg by G-tube route daily.   ? emollient base (EMOLLIENT TOP) Apply 2 g topically daily.   ? enoxaparin (LOVENOX) 40 mg/0.4 mL syringe Inject 40 mg under the skin daily.   ? heparin sodium,porcine (HEPARIN, PORCINE,) 5,000 unit/mL injection Inject 5,000 Units under the skin every 8 (eight) hours.   ? insulin glargine (LANTUS) 100 unit/mL injection Inject 8 Units under the skin at bedtime.   ? ipratropium-albuterol (DUO-NEB) 0.5-2.5 mg/3 mL nebulizer Inhale 3 mL 4 (four) times a day as needed.   ? ticagrelor (BRILINTA) 90 mg Tab 90 mg 2 (two) times a day. Via G-tube       REVIEW OF SYSTEM:  Unable to complete due to neurologic status of patient.     PHYSICAL EXAM:   General appearance: alert, appears stated age and cooperative--however nonverbal and does not communicate  HEENT: Head is normocephalic with normal hair distribution. No evidence of trauma. Ears: Without lesions or deformity. No acute purulent discharge. Eyes: Conjunctivae pink with no scleral icterus or erythema. Nose: Normal mucosa and septum. Oropharnyx: mmm, no lesions present.  Lungs: poor inspiratory effort, clear to auscultation bilaterally, respirations without effort  Heart: regular rate and rhythm, S1, S2 normal, no murmur, click, rub or gallop  Abdomen: soft, non-tender; bowel sounds normal; no masses,  no organomegaly, g-tube in place and infusing  Extremities: extremities normal, atraumatic, no cyanosis or edema  Pulses: 2+ and symmetric  Skin: Skin color, texture, turgor normal. No rashes or lesions  Neurologic: nonverbal, left sided hemiplegia, aphasia, immobile  Psych: pleasant      LABS:   None today.     ASSESSMENT:      ICD-10-CM    1. Physical deconditioning R53.81    2. Left hemiparesis (H) G81.94    3. Intracranial hemorrhage (H) I62.9    4. Dysphagia, unspecified type R13.10    5. Aphasia  R47.01    6. Cerebrovascular accident (CVA), unspecified mechanism (H) I63.9        PLAN:       Physical Deconditioning due to Cardiogenic Shock, Intracranial Hemorrhage, CVA- chronic, stable  -Continue PT/OT and other therapies as per care plan.  -Encouraged good nutrition and mobility as tolerated.  -Discussed care plan and expected course of stay.   -Continue to follow-up per routine schedule or sooner if needed.     CVA with Hemiparesis, Dysphagia, Aphasia- chronic, stable  -PT/OT as directed  -White board with dry erase marker for communication.   -Communication board.     Dysphagia- chronic, stable  -SLP to work with patient--consult, eval & treat.   -Tube feeding.    Otherwise continue current care plan for all other chronic medical conditions, as they are stable. Encouraged patient to engage in healthy lifestyle behaviors such as engaging in social activities, exercising (PT/OT), eating well, and following care plan. Follow up for routine check-up, or sooner if needed. Will continue to monitor patient and work with nursing staff collaboratively to work toward positive patient outcomes.    Electronically signed by: Heather Aj CNP

## 2021-06-18 NOTE — LETTER
Letter by Heather Aj CNP at      Author: Heather Aj CNP Service: -- Author Type: --    Filed:  Encounter Date: 2/27/2019 Status: (Other)         Patient: Osvaldo Rollins   MR Number: [id number]   YOB: 1960   Date of Visit: 2/27/2019     [facility]    Facility:   [facility] [[id number]]   Code Status: FULL CODE and POLST AVAILABLE      CHIEF COMPLAINT/REASON FOR VISIT:  Chief Complaint   Patient presents with   ? Review Of Multiple Medical Conditions     physical deconditioning, CVA, hemiparesis, aphasia, dysphagia       HISTORY:      HPI: Osvaldo is a 58 y.o. male with complex past medical history including CVA, intracranial hemorrhage, aphasia, dysphagia, acute respiratory failure with hypoxia, CAD, cardiogenic shock, left hemiparesis (other's outlined in pmh table) presenting to [facility] for rehabilitation s/p CVA. He was hospitalized at [facility] on 11/1/2018 for a STEMI and L MCA occlusion, he was discharged on 11/21/2018 and went to [facility] where he was to undergo rehabilitative therapies. Discharging provider from [facility] summarized hospitalization in previous notes.  Has made some progress since transfer to [facility] on 11/21/18. Has had improvement to function of his RUE, is able to communicate via handwriting. Was initially on ventilator support, but gradually improved; eventually decannulated 12/18/18.  Hospital stay summarized in previous notes.    Today Osvaldo is being evaluated for a routine review of multiple medical problems. He is doing quite well today and is actually holding a conversation with writer via a whiteboard. He is able to answer simple questions and is able to share what he is feeling. Osvaldo is following commands well. He states he is feeling good and that things are going well. He has been working hard at therapy. He does not have any family with him at this time. Osvaldo denies any other  concerns including fevers/chills, cough or cold symptoms, headaches, vision changes, chest pain/pressure, difficulty breathing, SOB, abdominal pain, nausea, vomiting, diarrhea, dysuria, increasing weakness, increasing pain.       Past Medical History:   Diagnosis Date   ? Acute respiratory failure with hypoxia (H)    ? Aphasia    ? Aspiration pneumonia (H)    ? CAD (coronary artery disease)    ? Cardiogenic shock (H)    ? CVA (cerebral vascular accident) (H)    ? Dysphagia    ? HLD (hyperlipidemia)    ? Hyperglycemia    ? Hypotension    ? Intracranial hemorrhage (H)    ? Late effects of cerebral ischemic stroke    ? Left hemiparesis (H)    ? Stroke (H)              Family History   Problem Relation Age of Onset   ? Deep vein thrombosis Brother    ? Stroke Maternal Grandfather      Social History     Socioeconomic History   ? Marital status: Single     Spouse name: None   ? Number of children: None   ? Years of education: None   ? Highest education level: None   Occupational History   ? None   Social Needs   ? Financial resource strain: None   ? Food insecurity:     Worry: None     Inability: None   ? Transportation needs:     Medical: None     Non-medical: None   Tobacco Use   ? Smoking status: Never Smoker   ? Smokeless tobacco: Never Used   Substance and Sexual Activity   ? Alcohol use: Yes   ? Drug use: None   ? Sexual activity: None   Lifestyle   ? Physical activity:     Days per week: None     Minutes per session: None   ? Stress: None   Relationships   ? Social connections:     Talks on phone: None     Gets together: None     Attends Anabaptism service: None     Active member of club or organization: None     Attends meetings of clubs or organizations: None     Relationship status: None   ? Intimate partner violence:     Fear of current or ex partner: None     Emotionally abused: None     Physically abused: None     Forced sexual activity: None   Other Topics Concern   ? None   Social History Narrative   ? None      Current Outpatient Medications   Medication Sig   ? acetaminophen (TYLENOL) 160 mg/5 mL solution 650 mg by G-tube route every 4 (four) hours as needed for fever or pain.          ? aspirin 81 mg chewable tablet 81 mg by G-tube route daily.   ? atorvastatin (LIPITOR) 40 MG tablet 40 mg by G-tube route at bedtime.   ? baclofen (LIORESAL) 20 MG tablet 20 mg by G-tube route 3 (three) times a day.   ? bromocriptine (PARLODEL) 2.5 mg tablet 2.5 mg by G-tube route 2 (two) times a day.   ? chlorhexidine (PERIDEX) 0.12 % solution Apply 15 mL to the mouth or throat 2 (two) times a day.   ? citalopram (CELEXA) 10 MG tablet 10 mg by G-tube route daily.   ? donepezil (ARICEPT) 5 MG tablet 5 mg by G-tube route daily.   ? emollient base (EMOLLIENT TOP) Apply 2 g topically daily.   ? enoxaparin (LOVENOX) 40 mg/0.4 mL syringe Inject 40 mg under the skin daily.   ? heparin sodium,porcine (HEPARIN, PORCINE,) 5,000 unit/mL injection Inject 5,000 Units under the skin every 8 (eight) hours.   ? insulin glargine (LANTUS) 100 unit/mL injection Inject 8 Units under the skin at bedtime.   ? ipratropium-albuterol (DUO-NEB) 0.5-2.5 mg/3 mL nebulizer Inhale 3 mL 4 (four) times a day as needed.   ? ticagrelor (BRILINTA) 90 mg Tab 90 mg 2 (two) times a day. Via G-tube       REVIEW OF SYSTEM:  Per HPI.     PHYSICAL EXAM:   General appearance: alert, appears stated age and cooperative  HEENT: Head is normocephalic with normal hair distribution. No evidence of trauma. Ears: Without lesions or deformity. No acute purulent discharge. Eyes: Conjunctivae pink with no scleral icterus or erythema. Nose: Normal mucosa and septum. Oropharnyx: mmm, no lesions present.  Lungs: clear to auscultation bilaterally, respirations without effort  Heart: regular rate and rhythm, S1, S2 normal, no murmur, click, rub or gallop  Abdomen: soft, non-tender; bowel sounds normal; no masses,  no organomegaly, g-tube in place and infusing  Extremities: extremities normal,  atraumatic, no cyanosis or edema  Pulses: 2+ and symmetric  Skin: Skin color, texture, turgor normal. No rashes or lesions  Neurologic: nonverbal, left sided hemiplegia, aphasia, immobile, will attempt to follow commands and is able to use right hand and foot, able to communicate well today with white board.  Psych: pleasant      LABS:   None today.     ASSESSMENT:      ICD-10-CM    1. Physical deconditioning R53.81    2. Left hemiparesis (H) G81.94    3. Intracranial hemorrhage (H) I62.9    4. Dysphagia, unspecified type R13.10        PLAN:     No changes to care plan warranted. Care plan reviewed and remains appropriate. At this time plan is for patient to use all 100 days of therapy. Care conference planned for early next week to initiate further planning for discharge.    Physical Deconditioning due to Cardiogenic Shock, Intracranial Hemorrhage, CVA- chronic, stable  -Continue PT/OT and other therapies as per care plan.  -Encouraged good nutrition and mobility as tolerated.  -Discussed care plan and expected course of stay.   -Continue to follow-up per routine schedule or sooner if needed.     CVA with Hemiparesis, Dysphagia, Aphasia- chronic, stable  -PT/OT as directed  -White board with dry erase marker for communication.   -Communication board.     Dysphagia- chronic, stable  -SLP to work with patient--consult, eval & treat.   -Tube feeding.    Otherwise continue current care plan for all other chronic medical conditions, as they are stable. Encouraged patient to engage in healthy lifestyle behaviors such as engaging in social activities, exercising (PT/OT), eating well, and following care plan. Follow up for routine check-up, or sooner if needed. Will continue to monitor patient and work with nursing staff collaboratively to work toward positive patient outcomes.    Electronically signed by: Heather Aj CNP

## 2021-06-18 NOTE — LETTER
Letter by Heather Aj CNP at      Author: Heather Aj CNP Service: -- Author Type: --    Filed:  Encounter Date: 2/1/2019 Status: (Other)         Patient: Osvaldo Rollins   MR Number: [id number]   YOB: 1960   Date of Visit: 2/1/2019     [facility]    Facility:   [facility] [[id number]]   Code Status: FULL CODE and POLST AVAILABLE      CHIEF COMPLAINT/REASON FOR VISIT:  Chief Complaint   Patient presents with   ? Review Of Multiple Medical Conditions     physical deconditioning, CVA, left hemiparesis, dysphagia, aphasia       HISTORY:      HPI: Osvaldo is a 58 y.o. male with complex past medical history including CVA, intracranial hemorrhage, aphasia, dysphagia, acute respiratory failure with hypoxia, CAD, cardiogenic shock, left hemiparesis (other's outlined in pmh table) presenting to [facility] for rehabilitation s/p CVA. He was hospitalized at [facility] on 11/1/2018 for a STEMI and L MCA occlusion, he was discharged on 11/21/2018 and went to [facility] where he was to undergo rehabilitative therapies. Discharging provider from [facility] summarized hospitalization in previous notes.  Has made some progress since transfer to [facility] on 11/21/18. Has had improvement to function of his RUE, is able to communicate via handwriting. Was initially on ventilator support, but gradually improved; eventually decannulated 12/18/18.  Hospital stay summarized in previous notes.    Mr. Rollins is being seen today for a review of multiple medical conditions. Osvaldo is doing well per nursing report. He is nonverbal but does follow two commands today. He does give me a high-five and Osvaldo does squeeze my hand. Nurse manager Candace states that he has occasionally been able to follow more and more commands. Osvaldo does not have any meaningful communication at this time, however it does seem he is attempting to connect. There are no new concerns from  nursing. Sister Lee Ann is not at bedside during this visit.     Past Medical History:   Diagnosis Date   ? Acute respiratory failure with hypoxia (H)    ? Aphasia    ? Aspiration pneumonia (H)    ? CAD (coronary artery disease)    ? Cardiogenic shock (H)    ? CVA (cerebral vascular accident) (H)    ? Dysphagia    ? HLD (hyperlipidemia)    ? Hyperglycemia    ? Hypotension    ? Intracranial hemorrhage (H)    ? Late effects of cerebral ischemic stroke    ? Left hemiparesis (H)    ? Stroke (H)              Family History   Problem Relation Age of Onset   ? Deep vein thrombosis Brother    ? Stroke Maternal Grandfather      Social History     Socioeconomic History   ? Marital status: Single     Spouse name: None   ? Number of children: None   ? Years of education: None   ? Highest education level: None   Social Needs   ? Financial resource strain: None   ? Food insecurity - worry: None   ? Food insecurity - inability: None   ? Transportation needs - medical: None   ? Transportation needs - non-medical: None   Occupational History   ? None   Tobacco Use   ? Smoking status: Never Smoker   ? Smokeless tobacco: Never Used   Substance and Sexual Activity   ? Alcohol use: Yes   ? Drug use: None   ? Sexual activity: None   Other Topics Concern   ? None   Social History Narrative   ? None     Current Outpatient Medications   Medication Sig   ? acetaminophen (TYLENOL) 160 mg/5 mL solution 650 mg by G-tube route every 4 (four) hours as needed for fever or pain.          ? aspirin 81 mg chewable tablet 81 mg by G-tube route daily.   ? atorvastatin (LIPITOR) 40 MG tablet 40 mg by G-tube route at bedtime.   ? baclofen (LIORESAL) 20 MG tablet 20 mg by G-tube route 3 (three) times a day.   ? bromocriptine (PARLODEL) 2.5 mg tablet 2.5 mg by G-tube route 2 (two) times a day.   ? chlorhexidine (PERIDEX) 0.12 % solution Apply 15 mL to the mouth or throat 2 (two) times a day.   ? citalopram (CELEXA) 10 MG tablet 10 mg by G-tube route  daily.   ? donepezil (ARICEPT) 5 MG tablet 5 mg by G-tube route daily.   ? emollient base (EMOLLIENT TOP) Apply 2 g topically daily.   ? enoxaparin (LOVENOX) 40 mg/0.4 mL syringe Inject 40 mg under the skin daily.   ? heparin sodium,porcine (HEPARIN, PORCINE,) 5,000 unit/mL injection Inject 5,000 Units under the skin every 8 (eight) hours.   ? insulin glargine (LANTUS) 100 unit/mL injection Inject 8 Units under the skin at bedtime.   ? ipratropium-albuterol (DUO-NEB) 0.5-2.5 mg/3 mL nebulizer Inhale 3 mL 4 (four) times a day as needed.   ? ticagrelor (BRILINTA) 90 mg Tab 90 mg 2 (two) times a day. Via G-tube       REVIEW OF SYSTEM:  Unable to complete due to neurologic status of patient.     PHYSICAL EXAM:   General appearance: alert, appears stated age and cooperative--however nonverbal and does not communicate  HEENT: Head is normocephalic with normal hair distribution. No evidence of trauma. Ears: Without lesions or deformity. No acute purulent discharge. Eyes: Conjunctivae pink with no scleral icterus or erythema. Nose: Normal mucosa and septum. Oropharnyx: mmm, no lesions present.  Lungs: poor inspiratory effort, clear to auscultation bilaterally, respirations without effort  Heart: regular rate and rhythm, S1, S2 normal, no murmur, click, rub or gallop  Abdomen: soft, non-tender; bowel sounds normal; no masses,  no organomegaly, g-tube in place and infusing  Extremities: extremities normal, atraumatic, no cyanosis or edema  Pulses: 2+ and symmetric  Skin: Skin color, texture, turgor normal. No rashes or lesions  Neurologic: nonverbal, left sided hemiplegia, aphasia, immobile  Psych: pleasant      LABS:   None today.     ASSESSMENT:      ICD-10-CM    1. Physical deconditioning R53.81    2. Left hemiparesis (H) G81.94    3. Intracranial hemorrhage (H) I62.9    4. Cerebrovascular accident (CVA), unspecified mechanism (H) I63.9    5. Dysphagia, unspecified type R13.10    6. Aphasia R47.01        PLAN:     Care  plan reviewed and remains appropriate. Continue therapies.     Physical Deconditioning due to Cardiogenic Shock, Intracranial Hemorrhage, CVA- chronic, stable  -Continue PT/OT and other therapies as per care plan.  -Encouraged good nutrition and mobility as tolerated.  -Discussed care plan and expected course of stay.   -Continue to follow-up per routine schedule or sooner if needed.     CVA with Hemiparesis, Dysphagia, Aphasia- chronic, stable  -PT/OT as directed  -White board with dry erase marker for communication.   -Communication board.     Dysphagia- chronic, stable  -SLP to work with patient--consult, eval & treat.   -Tube feeding.    Otherwise continue current care plan for all other chronic medical conditions, as they are stable. Encouraged patient to engage in healthy lifestyle behaviors such as engaging in social activities, exercising (PT/OT), eating well, and following care plan. Follow up for routine check-up, or sooner if needed. Will continue to monitor patient and work with nursing staff collaboratively to work toward positive patient outcomes.    Electronically signed by: Heather Aj CNP

## 2021-06-18 NOTE — LETTER
Letter by Heather Aj CNP at      Author: Heather Aj CNP Service: -- Author Type: --    Filed:  Encounter Date: 1/21/2019 Status: (Other)         Patient: Osvaldo Rollins   MR Number: [id number]   YOB: 1960   Date of Visit: 1/21/2019     [facility]    Facility:   [facility] [[id number]]   Code Status: FULL CODE and POLST AVAILABLE      CHIEF COMPLAINT/REASON FOR VISIT:  Chief Complaint   Patient presents with   ? Review Of Multiple Medical Conditions     physical deconditioning, intracranial hemorrhage, CVA, aphasia, dysphagi        HISTORY:      HPI: Osvaldo is a 58 y.o. male with complex past medical history including CVA, intracranial hemorrhage, aphasia, dysphagia, acute respiratory failure with hypoxia, CAD, cardiogenic shock, left hemiparesis (other's outlined in pmh table) presenting to [facility] for rehabilitation s/p CVA. He was hospitalized at [facility] on 11/1/2018 for a STEMI and L MCA occlusion, he was discharged on 11/21/2018 and went to [facility] where he was to undergo rehabilitative therapies. Discharging provider from [facility] summarized hospitalization in previous notes.  Has made some progress since transfer to [facility] on 11/21/18. Has had improvement to function of his RUE, is able to communicate via handwriting. Was initially on ventilator support, but gradually improved; eventually decannulated 12/18/18.  Hospital stay summarized in previous notes.    Mr. Rollins is being seen today for a review of multiple medical conditions.  He is laying in bed alert and nonverbal. He does allow for examination. He is not using his pen and paper or making any meaningful attempts at communication until he is moved to his chair and his sister comes to his side. He is then holding writers hand and squeezing it but he is not following commands. Osvaldo continues to make very little progress in therapies. Sister Lee Ann  comes to his bedside daily and reports spending 6 hours working with him. She is wanting to know exactly what is happening with SLP therapies, she did ask them but did not understand. Did discuss at length. Her goal remains to bring him home, however Mr. Rollins does not seem to be making any progress and would need 24/7 care. No other concerns from family or staff.       Past Medical History:   Diagnosis Date   ? Acute respiratory failure with hypoxia (H)    ? Aphasia    ? Aspiration pneumonia (H)    ? CAD (coronary artery disease)    ? Cardiogenic shock (H)    ? CVA (cerebral vascular accident) (H)    ? Dysphagia    ? HLD (hyperlipidemia)    ? Hyperglycemia    ? Hypotension    ? Intracranial hemorrhage (H)    ? Late effects of cerebral ischemic stroke    ? Left hemiparesis (H)    ? Stroke (H)              Family History   Problem Relation Age of Onset   ? Deep vein thrombosis Brother    ? Stroke Maternal Grandfather      Social History     Socioeconomic History   ? Marital status: Single     Spouse name: None   ? Number of children: None   ? Years of education: None   ? Highest education level: None   Social Needs   ? Financial resource strain: None   ? Food insecurity - worry: None   ? Food insecurity - inability: None   ? Transportation needs - medical: None   ? Transportation needs - non-medical: None   Occupational History   ? None   Tobacco Use   ? Smoking status: Never Smoker   ? Smokeless tobacco: Never Used   Substance and Sexual Activity   ? Alcohol use: Yes   ? Drug use: None   ? Sexual activity: None   Other Topics Concern   ? None   Social History Narrative   ? None     Current Outpatient Medications   Medication Sig   ? acetaminophen (TYLENOL) 160 mg/5 mL solution 650 mg by G-tube route every 4 (four) hours as needed for fever or pain.          ? aspirin 81 mg chewable tablet 81 mg by G-tube route daily.   ? atorvastatin (LIPITOR) 40 MG tablet 40 mg by G-tube route at bedtime.   ? baclofen (LIORESAL) 20  MG tablet 20 mg by G-tube route 3 (three) times a day.   ? bromocriptine (PARLODEL) 2.5 mg tablet 2.5 mg by G-tube route 2 (two) times a day.   ? chlorhexidine (PERIDEX) 0.12 % solution Apply 15 mL to the mouth or throat 2 (two) times a day.   ? citalopram (CELEXA) 10 MG tablet 10 mg by G-tube route daily.   ? donepezil (ARICEPT) 5 MG tablet 5 mg by G-tube route daily.   ? emollient base (EMOLLIENT TOP) Apply 2 g topically daily.   ? enoxaparin (LOVENOX) 40 mg/0.4 mL syringe Inject 40 mg under the skin daily.   ? heparin sodium,porcine (HEPARIN, PORCINE,) 5,000 unit/mL injection Inject 5,000 Units under the skin every 8 (eight) hours.   ? insulin glargine (LANTUS) 100 unit/mL injection Inject 8 Units under the skin at bedtime.   ? ipratropium-albuterol (DUO-NEB) 0.5-2.5 mg/3 mL nebulizer Inhale 3 mL 4 (four) times a day as needed.   ? ticagrelor (BRILINTA) 90 mg Tab 90 mg 2 (two) times a day. Via G-tube       REVIEW OF SYSTEM:  Unable to complete due to neurologic status of patient.     PHYSICAL EXAM:   General appearance: alert, appears stated age and cooperative--however nonverbal and does not communicate  HEENT: Head is normocephalic with normal hair distribution. No evidence of trauma. Ears: Without lesions or deformity. No acute purulent discharge. Eyes: Conjunctivae pink with no scleral icterus or erythema. Nose: Normal mucosa and septum. Oropharnyx: mmm, no lesions present.  Lungs: poor inspiratory effort, clear to auscultation bilaterally, respirations without effort  Heart: regular rate and rhythm, S1, S2 normal, no murmur, click, rub or gallop  Abdomen: soft, non-tender; bowel sounds normal; no masses,  no organomegaly, g-tube in place and infusing  Extremities: extremities normal, atraumatic, no cyanosis or edema  Pulses: 2+ and symmetric  Skin: Skin color, texture, turgor normal. No rashes or lesions  Neurologic: nonverbal, left sided hemiplegia, aphasia, immobile  Psych: pleasant      LABS:   None  today.     ASSESSMENT:      ICD-10-CM    1. Physical deconditioning R53.81    2. Left hemiparesis (H) G81.94    3. Intracranial hemorrhage (H) I62.9    4. Dysphagia, unspecified type R13.10    5. Aphasia R47.01    6. Cerebrovascular accident (CVA), unspecified mechanism (H) I63.9        PLAN:       Physical Deconditioning due to Cardiogenic Shock, Intracranial Hemorrhage, CVA- chronic, stable  -Continue PT/OT and other therapies as per care plan.  -Encouraged good nutrition and mobility as tolerated.  -Discussed care plan and expected course of stay.   -Continue to follow-up per routine schedule or sooner if needed.     CVA with Hemiparesis, Dysphagia, Aphasia- chronic, stable  -PT/OT as directed  -White board with dry erase marker for communication.   -Communication board.     Dysphagia- chronic, stable  -SLP to work with patient--consult, eval & treat.   -Tube feeding, increase to rate of 56 cc/hr continuously.     Otherwise continue current care plan for all other chronic medical conditions, as they are stable. Encouraged patient to engage in healthy lifestyle behaviors such as engaging in social activities, exercising (PT/OT), eating well, and following care plan. Follow up for routine check-up, or sooner if needed. Will continue to monitor patient and work with nursing staff collaboratively to work toward positive patient outcomes.    Total time of 35 minutes spent with patient, sister Lee Ann and nursing staff with greater than 50% of this time spent on review of previous records, counseling, education, and discussion of the above care plan with nursing staff, Kaylaundra and patient.     Electronically signed by: Heather Aj CNP

## 2021-06-18 NOTE — LETTER
"Letter by Josefina Tariq CNP at      Author: Josefina Tariq CNP Service: -- Author Type: --    Filed:  Encounter Date: 3/5/2019 Status: (Other)         Patient: Osvaldo Rollins   MR Number: [id number]   YOB: 1960   Date of Visit: 3/5/2019     [facility]    Facility:   [facility] [[id number]]   Code Status: FULL CODE and POLST AVAILABLE      CHIEF COMPLAINT/REASON FOR VISIT:  Chief Complaint   Patient presents with   ? Review Of Multiple Medical Conditions       HISTORY:      HPI: Osvaldo is a 58 y.o. male seen today for a review of medical conditions. Osvaldo has a complex  past medical history including CVA, intracranial hemorrhage, aphasia, dysphagia, acute respiratory failure with hypoxia, CAD, cardiogenic shock, left hemiparesis . He was hospitalized at [facility] on 11/1/2018 for a STEMI and L MCA occlusion, he was discharged on 11/21/2018 and went to [facility] where he was to undergo rehabilitative therapies.He is able to use his communication board a nd when asked what was up today he wrote ,\"nothin new\". Per nursing report incontinent of B/B and is totally dependant on staff for all cares.      Past Medical History:   Diagnosis Date   ? Acute respiratory failure with hypoxia (H)    ? Aphasia    ? Aspiration pneumonia (H)    ? CAD (coronary artery disease)    ? Cardiogenic shock (H)    ? CVA (cerebral vascular accident) (H)    ? Dysphagia    ? HLD (hyperlipidemia)    ? Hyperglycemia    ? Hypotension    ? Intracranial hemorrhage (H)    ? Late effects of cerebral ischemic stroke    ? Left hemiparesis (H)    ? Stroke (H)              Family History   Problem Relation Age of Onset   ? Deep vein thrombosis Brother    ? Stroke Maternal Grandfather      Social History     Socioeconomic History   ? Marital status: Single     Spouse name: Not on file   ? Number of children: Not on file   ? Years of education: Not on file   ? Highest education " level: Not on file   Occupational History   ? Not on file   Social Needs   ? Financial resource strain: Not on file   ? Food insecurity:     Worry: Not on file     Inability: Not on file   ? Transportation needs:     Medical: Not on file     Non-medical: Not on file   Tobacco Use   ? Smoking status: Never Smoker   ? Smokeless tobacco: Never Used   Substance and Sexual Activity   ? Alcohol use: Yes   ? Drug use: Not on file   ? Sexual activity: Not on file   Lifestyle   ? Physical activity:     Days per week: Not on file     Minutes per session: Not on file   ? Stress: Not on file   Relationships   ? Social connections:     Talks on phone: Not on file     Gets together: Not on file     Attends Denominational service: Not on file     Active member of club or organization: Not on file     Attends meetings of clubs or organizations: Not on file     Relationship status: Not on file   ? Intimate partner violence:     Fear of current or ex partner: Not on file     Emotionally abused: Not on file     Physically abused: Not on file     Forced sexual activity: Not on file   Other Topics Concern   ? Not on file   Social History Narrative   ? Not on file     Current Outpatient Medications   Medication Sig   ? acetaminophen (TYLENOL) 160 mg/5 mL solution 650 mg by G-tube route every 4 (four) hours as needed for fever or pain.          ? aspirin 81 mg chewable tablet 81 mg by G-tube route daily.   ? atorvastatin (LIPITOR) 40 MG tablet 40 mg by G-tube route at bedtime.   ? baclofen (LIORESAL) 20 MG tablet 20 mg by G-tube route 3 (three) times a day.   ? bromocriptine (PARLODEL) 2.5 mg tablet 2.5 mg by G-tube route 2 (two) times a day.   ? chlorhexidine (PERIDEX) 0.12 % solution Apply 15 mL to the mouth or throat 2 (two) times a day.   ? citalopram (CELEXA) 10 MG tablet 10 mg by G-tube route daily.   ? donepezil (ARICEPT) 5 MG tablet 5 mg by G-tube route daily.   ? emollient base (EMOLLIENT TOP) Apply 2 g topically daily.   ? enoxaparin  (LOVENOX) 40 mg/0.4 mL syringe Inject 40 mg under the skin daily.   ? heparin sodium,porcine (HEPARIN, PORCINE,) 5,000 unit/mL injection Inject 5,000 Units under the skin every 8 (eight) hours.   ? insulin glargine (LANTUS) 100 unit/mL injection Inject 8 Units under the skin at bedtime.   ? ipratropium-albuterol (DUO-NEB) 0.5-2.5 mg/3 mL nebulizer Inhale 3 mL 4 (four) times a day as needed.   ? ticagrelor (BRILINTA) 90 mg Tab 90 mg 2 (two) times a day. Via G-tube       REVIEW OF SYSTEM:  Per HPI.     PHYSICAL EXAM:   General appearance: alert, young appearing male , non verbal  HEENT: Head is normocephalic with normal hair distribution. No evidence of trauma. Ears: Without lesions or deformity. No acute purulent discharge. Eyes: Conjunctivae pink with no scleral icterus or erythema. Nose: Normal mucosa and septum. Oropharnyx: mmm, no lesions present.  Lungs: clear to auscultation bilaterally, respirations without effort  Heart: regular rate and rhythm, S1, S2 normal  Abdomen: soft, non-tender; bowel sounds normal; no masses,  no organomegaly, g-tube in place and infusing  Extremities: extremities normal, atraumatic, no cyanosis or edema left mesfin  Pulses: 2+ and symmetric  Skin: Skin color, texture, turgor normal. No rashes or lesions  Neurologic: nonverbal, left sided hemiplegia, aphasia, immobile, will attempt to follow commands and is able to use right hand and foot, able to communicate well today with white board, wrote info down when asked        LABS:   None today.     ASSESSMENT:      ICD-10-CM    1. Cerebrovascular accident (CVA), unspecified mechanism (H) I63.9    2. Left hemiparesis (H) G81.94    3. Dysphagia, unspecified type R13.10    4. Aphasia R47.01        PLAN:     CVA with Hemiparesis, Dysphagia, Aphasia- chronic, stable  -PT/OT as directed  -White board with dry erase marker for communication.   -Communication board.  - Staff providing all ADL, grooming, left mesfin and flacid    Dysphagia- chronic,  stable  -SLP to work with patient--consult, eval & treat.   -Tube feeding, daily care to peg site  - Close monitoring of wts and dietary following    Electronically signed by: Josefina Tariq CNP

## 2021-06-18 NOTE — LETTER
Letter by Heather Aj CNP at      Author: Heather Aj CNP Service: -- Author Type: --    Filed:  Encounter Date: 2/22/2019 Status: (Other)         Patient: Osvaldo Rollins   MR Number: [id number]   YOB: 1960   Date of Visit: 2/22/2019     [facility]    Facility:   [facility] [[id number]]   Code Status: FULL CODE and POLST AVAILABLE      CHIEF COMPLAINT/REASON FOR VISIT:  Chief Complaint   Patient presents with   ? Review Of Multiple Medical Conditions     physical deconditioning, CVA, aphasia, dysphagia, left hemiparesis       HISTORY:      HPI: Osvaldo is a 58 y.o. male with complex past medical history including CVA, intracranial hemorrhage, aphasia, dysphagia, acute respiratory failure with hypoxia, CAD, cardiogenic shock, left hemiparesis (other's outlined in pmh table) presenting to [facility] for rehabilitation s/p CVA. He was hospitalized at [facility] on 11/1/2018 for a STEMI and L MCA occlusion, he was discharged on 11/21/2018 and went to [facility] where he was to undergo rehabilitative therapies. Discharging provider from [facility] summarized hospitalization in previous notes.  Has made some progress since transfer to [facility] on 11/21/18. Has had improvement to function of his RUE, is able to communicate via handwriting. Was initially on ventilator support, but gradually improved; eventually decannulated 12/18/18.  Hospital stay summarized in previous notes.    Mr. Rollins is being seen today for a review of multiple medical conditions. Osvaldo is resting in his wheelchair in the day room. He is is unable to express himself but will follow simple commands. Does not attempt to write anything today. He will squeeze my hand, give a high five, tap right foot. Still unable to use left side. He is now wearing a brace on his left arm. Family is not at his side at this time. Unable to complete ROS due to neurological status but no  concerns from nursing staff at this time.    Past Medical History:   Diagnosis Date   ? Acute respiratory failure with hypoxia (H)    ? Aphasia    ? Aspiration pneumonia (H)    ? CAD (coronary artery disease)    ? Cardiogenic shock (H)    ? CVA (cerebral vascular accident) (H)    ? Dysphagia    ? HLD (hyperlipidemia)    ? Hyperglycemia    ? Hypotension    ? Intracranial hemorrhage (H)    ? Late effects of cerebral ischemic stroke    ? Left hemiparesis (H)    ? Stroke (H)              Family History   Problem Relation Age of Onset   ? Deep vein thrombosis Brother    ? Stroke Maternal Grandfather      Social History     Socioeconomic History   ? Marital status: Single     Spouse name: None   ? Number of children: None   ? Years of education: None   ? Highest education level: None   Occupational History   ? None   Social Needs   ? Financial resource strain: None   ? Food insecurity:     Worry: None     Inability: None   ? Transportation needs:     Medical: None     Non-medical: None   Tobacco Use   ? Smoking status: Never Smoker   ? Smokeless tobacco: Never Used   Substance and Sexual Activity   ? Alcohol use: Yes   ? Drug use: None   ? Sexual activity: None   Lifestyle   ? Physical activity:     Days per week: None     Minutes per session: None   ? Stress: None   Relationships   ? Social connections:     Talks on phone: None     Gets together: None     Attends Caodaism service: None     Active member of club or organization: None     Attends meetings of clubs or organizations: None     Relationship status: None   ? Intimate partner violence:     Fear of current or ex partner: None     Emotionally abused: None     Physically abused: None     Forced sexual activity: None   Other Topics Concern   ? None   Social History Narrative   ? None     Current Outpatient Medications   Medication Sig   ? acetaminophen (TYLENOL) 160 mg/5 mL solution 650 mg by G-tube route every 4 (four) hours as needed for fever or pain.          ?  aspirin 81 mg chewable tablet 81 mg by G-tube route daily.   ? atorvastatin (LIPITOR) 40 MG tablet 40 mg by G-tube route at bedtime.   ? baclofen (LIORESAL) 20 MG tablet 20 mg by G-tube route 3 (three) times a day.   ? bromocriptine (PARLODEL) 2.5 mg tablet 2.5 mg by G-tube route 2 (two) times a day.   ? chlorhexidine (PERIDEX) 0.12 % solution Apply 15 mL to the mouth or throat 2 (two) times a day.   ? citalopram (CELEXA) 10 MG tablet 10 mg by G-tube route daily.   ? donepezil (ARICEPT) 5 MG tablet 5 mg by G-tube route daily.   ? emollient base (EMOLLIENT TOP) Apply 2 g topically daily.   ? enoxaparin (LOVENOX) 40 mg/0.4 mL syringe Inject 40 mg under the skin daily.   ? heparin sodium,porcine (HEPARIN, PORCINE,) 5,000 unit/mL injection Inject 5,000 Units under the skin every 8 (eight) hours.   ? insulin glargine (LANTUS) 100 unit/mL injection Inject 8 Units under the skin at bedtime.   ? ipratropium-albuterol (DUO-NEB) 0.5-2.5 mg/3 mL nebulizer Inhale 3 mL 4 (four) times a day as needed.   ? ticagrelor (BRILINTA) 90 mg Tab 90 mg 2 (two) times a day. Via G-tube       REVIEW OF SYSTEM:  Unable to complete due to neurologic status of patient.     PHYSICAL EXAM:   General appearance: alert, appears stated age and cooperative  HEENT: Head is normocephalic with normal hair distribution. No evidence of trauma. Ears: Without lesions or deformity. No acute purulent discharge. Eyes: Conjunctivae pink with no scleral icterus or erythema. Nose: Normal mucosa and septum. Oropharnyx: mmm, no lesions present.  Lungs: clear to auscultation bilaterally, respirations without effort  Heart: regular rate and rhythm, S1, S2 normal, no murmur, click, rub or gallop  Abdomen: soft, non-tender; bowel sounds normal; no masses,  no organomegaly, g-tube in place and infusing  Extremities: extremities normal, atraumatic, no cyanosis or edema  Pulses: 2+ and symmetric  Skin: Skin color, texture, turgor normal. No rashes or lesions  Neurologic:  nonverbal, left sided hemiplegia, aphasia, immobile, will attempt to follow commands and is able to use right hand and foot.   Psych: pleasant      LABS:   None today.     ASSESSMENT:      ICD-10-CM    1. Physical deconditioning R53.81    2. Left hemiparesis (H) G81.94    3. Intracranial hemorrhage (H) I62.9    4. Aphasia R47.01    5. Dysphagia, unspecified type R13.10    6. Cerebrovascular accident (CVA), unspecified mechanism (H) I63.9        PLAN:     No changes to care plan warranted. Care plan reviewed and remains appropriate. Patient is slowly progressing.    Physical Deconditioning due to Cardiogenic Shock, Intracranial Hemorrhage, CVA- chronic, stable  -Continue PT/OT and other therapies as per care plan.  -Encouraged good nutrition and mobility as tolerated.  -Discussed care plan and expected course of stay.   -Continue to follow-up per routine schedule or sooner if needed.     CVA with Hemiparesis, Dysphagia, Aphasia- chronic, stable  -PT/OT as directed  -White board with dry erase marker for communication.   -Communication board.     Dysphagia- chronic, stable  -SLP to work with patient--consult, eval & treat.   -Tube feeding.    Otherwise continue current care plan for all other chronic medical conditions, as they are stable. Encouraged patient to engage in healthy lifestyle behaviors such as engaging in social activities, exercising (PT/OT), eating well, and following care plan. Follow up for routine check-up, or sooner if needed. Will continue to monitor patient and work with nursing staff collaboratively to work toward positive patient outcomes.    Electronically signed by: Heather Aj CNP

## 2021-06-18 NOTE — LETTER
Letter by Heather Aj CNP at      Author: Heather Aj CNP Service: -- Author Type: --    Filed:  Encounter Date: 2/6/2019 Status: (Other)         Patient: Osvaldo Rollins   MR Number: [id number]   YOB: 1960   Date of Visit: 2/6/2019     [facility]    Facility:   [facility] [[id number]]   Code Status: FULL CODE and POLST AVAILABLE      CHIEF COMPLAINT/REASON FOR VISIT:  Chief Complaint   Patient presents with   ? Review Of Multiple Medical Conditions     physical deconditioning, CVA, aphasia, dysphagia, left hemiparesis       HISTORY:      HPI: Osvaldo is a 58 y.o. male with complex past medical history including CVA, intracranial hemorrhage, aphasia, dysphagia, acute respiratory failure with hypoxia, CAD, cardiogenic shock, left hemiparesis (other's outlined in pmh table) presenting to [facility] for rehabilitation s/p CVA. He was hospitalized at [facility] on 11/1/2018 for a STEMI and L MCA occlusion, he was discharged on 11/21/2018 and went to [facility] where he was to undergo rehabilitative therapies. Discharging provider from [facility] summarized hospitalization in previous notes.  Has made some progress since transfer to [facility] on 11/21/18. Has had improvement to function of his RUE, is able to communicate via handwriting. Was initially on ventilator support, but gradually improved; eventually decannulated 12/18/18.  Hospital stay summarized in previous notes.    Mr. Rollins is being seen today for a review of multiple medical conditions. Osvaldo is visiting with his brother today who is working Osvaldo out. They are working on his left side. Osvaldo does give me a high five with his right hand, and does squeeze my hand. Does not follow other commands, makes meaningful eye contact. Unable to share any concerns. Nursing staff feel that all is going well.     Past Medical History:   Diagnosis Date   ? Acute respiratory failure with  hypoxia (H)    ? Aphasia    ? Aspiration pneumonia (H)    ? CAD (coronary artery disease)    ? Cardiogenic shock (H)    ? CVA (cerebral vascular accident) (H)    ? Dysphagia    ? HLD (hyperlipidemia)    ? Hyperglycemia    ? Hypotension    ? Intracranial hemorrhage (H)    ? Late effects of cerebral ischemic stroke    ? Left hemiparesis (H)    ? Stroke (H)              Family History   Problem Relation Age of Onset   ? Deep vein thrombosis Brother    ? Stroke Maternal Grandfather      Social History     Socioeconomic History   ? Marital status: Single     Spouse name: None   ? Number of children: None   ? Years of education: None   ? Highest education level: None   Social Needs   ? Financial resource strain: None   ? Food insecurity - worry: None   ? Food insecurity - inability: None   ? Transportation needs - medical: None   ? Transportation needs - non-medical: None   Occupational History   ? None   Tobacco Use   ? Smoking status: Never Smoker   ? Smokeless tobacco: Never Used   Substance and Sexual Activity   ? Alcohol use: Yes   ? Drug use: None   ? Sexual activity: None   Other Topics Concern   ? None   Social History Narrative   ? None     Current Outpatient Medications   Medication Sig   ? acetaminophen (TYLENOL) 160 mg/5 mL solution 650 mg by G-tube route every 4 (four) hours as needed for fever or pain.          ? aspirin 81 mg chewable tablet 81 mg by G-tube route daily.   ? atorvastatin (LIPITOR) 40 MG tablet 40 mg by G-tube route at bedtime.   ? baclofen (LIORESAL) 20 MG tablet 20 mg by G-tube route 3 (three) times a day.   ? bromocriptine (PARLODEL) 2.5 mg tablet 2.5 mg by G-tube route 2 (two) times a day.   ? chlorhexidine (PERIDEX) 0.12 % solution Apply 15 mL to the mouth or throat 2 (two) times a day.   ? citalopram (CELEXA) 10 MG tablet 10 mg by G-tube route daily.   ? donepezil (ARICEPT) 5 MG tablet 5 mg by G-tube route daily.   ? emollient base (EMOLLIENT TOP) Apply 2 g topically daily.   ?  enoxaparin (LOVENOX) 40 mg/0.4 mL syringe Inject 40 mg under the skin daily.   ? heparin sodium,porcine (HEPARIN, PORCINE,) 5,000 unit/mL injection Inject 5,000 Units under the skin every 8 (eight) hours.   ? insulin glargine (LANTUS) 100 unit/mL injection Inject 8 Units under the skin at bedtime.   ? ipratropium-albuterol (DUO-NEB) 0.5-2.5 mg/3 mL nebulizer Inhale 3 mL 4 (four) times a day as needed.   ? ticagrelor (BRILINTA) 90 mg Tab 90 mg 2 (two) times a day. Via G-tube       REVIEW OF SYSTEM:  Unable to complete due to neurologic status of patient.     PHYSICAL EXAM:   General appearance: alert, appears stated age and cooperative--however nonverbal and does not communicate  HEENT: Head is normocephalic with normal hair distribution. No evidence of trauma. Ears: Without lesions or deformity. No acute purulent discharge. Eyes: Conjunctivae pink with no scleral icterus or erythema. Nose: Normal mucosa and septum. Oropharnyx: mmm, no lesions present.  Lungs: respirations without effort  Extremities: extremities normal, atraumatic, no cyanosis or edema  Pulses: 2+ and symmetric  Skin: Skin color, texture, turgor normal. No rashes or lesions  Neurologic: nonverbal, left sided hemiplegia, aphasia, immobile  Psych: pleasant      LABS:   None today.     ASSESSMENT:      ICD-10-CM    1. Physical deconditioning R53.81    2. Left hemiparesis (H) G81.94    3. Dysphagia, unspecified type R13.10    4. Aphasia R47.01    5. Cerebrovascular accident (CVA), unspecified mechanism (H) I63.9        PLAN:     Very brief visit due to family presence. Care plan remains appropriate at this time.     Physical Deconditioning due to Cardiogenic Shock, Intracranial Hemorrhage, CVA- chronic, stable  -Continue PT/OT and other therapies as per care plan.  -Encouraged good nutrition and mobility as tolerated.  -Discussed care plan and expected course of stay.   -Continue to follow-up per routine schedule or sooner if needed.     CVA with  Hemiparesis, Dysphagia, Aphasia- chronic, stable  -PT/OT as directed  -White board with dry erase marker for communication.   -Communication board.     Dysphagia- chronic, stable  -SLP to work with patient--consult, eval & treat.   -Tube feeding.    Otherwise continue current care plan for all other chronic medical conditions, as they are stable. Encouraged patient to engage in healthy lifestyle behaviors such as engaging in social activities, exercising (PT/OT), eating well, and following care plan. Follow up for routine check-up, or sooner if needed. Will continue to monitor patient and work with nursing staff collaboratively to work toward positive patient outcomes.    Electronically signed by: Heather Aj CNP

## 2021-06-18 NOTE — LETTER
Letter by Heather Aj CNP at      Author: Heather Aj CNP Service: -- Author Type: --    Filed:  Encounter Date: 1/17/2019 Status: (Other)         Patient: Osvaldo Rollins   MR Number: [id number]   YOB: 1960   Date of Visit: 1/17/2019     [facility]    Facility:   [facility] [[id number]]   Code Status: FULL CODE and POLST AVAILABLE      CHIEF COMPLAINT/REASON FOR VISIT:  Chief Complaint   Patient presents with   ? Review Of Multiple Medical Conditions       HISTORY:      HPI: Osvaldo is a 58 y.o. male with complex past medical history including CVA, intracranial hemorrhage, aphasia, dysphagia, acute respiratory failure with hypoxia, CAD, cardiogenic shock, left hemiparesis (other's outlined in pmh table) presenting to [facility] for rehabilitation s/p CVA. He was hospitalized at [facility] on 11/1/2018 for a STEMI and L MCA occlusion, he was discharged on 11/21/2018 and went to [facility] where he was to undergo rehabilitative therapies. Discharging provider from [facility] summarized hospitalization in previous notes.  Has made some progress since transfer to [facility] on 11/21/18. Has had improvement to function of his RUE, is able to communicate via handwriting. Was initially on ventilator support, but gradually improved; eventually decannulated 12/18/18.  Hospital stay summarized in previous notes.    Mr. Rollins is being seen today for a review of multiple medical conditions.  He is laying in bed alert and nonverbal.  He is not attempting to communicate with his pen and paper at this visit.  The staff reported that his tubefeeding has been going well.  He does not have any skin concerns at this visit.  The patient is able to assist with turning onto his left side according to nursing staff.  The nursing staff and the patient's sister, Lee Ann, do not have any new concerns for the patient at this visit.      Past Medical History:    Diagnosis Date   ? Acute respiratory failure with hypoxia (H)    ? Aphasia    ? Aspiration pneumonia (H)    ? CAD (coronary artery disease)    ? Cardiogenic shock (H)    ? CVA (cerebral vascular accident) (H)    ? Dysphagia    ? HLD (hyperlipidemia)    ? Hyperglycemia    ? Hypotension    ? Intracranial hemorrhage (H)    ? Late effects of cerebral ischemic stroke    ? Left hemiparesis (H)    ? Stroke (H)              Family History   Problem Relation Age of Onset   ? Deep vein thrombosis Brother    ? Stroke Maternal Grandfather      Social History     Socioeconomic History   ? Marital status: Single     Spouse name: None   ? Number of children: None   ? Years of education: None   ? Highest education level: None   Social Needs   ? Financial resource strain: None   ? Food insecurity - worry: None   ? Food insecurity - inability: None   ? Transportation needs - medical: None   ? Transportation needs - non-medical: None   Occupational History   ? None   Tobacco Use   ? Smoking status: Never Smoker   ? Smokeless tobacco: Never Used   Substance and Sexual Activity   ? Alcohol use: Yes   ? Drug use: None   ? Sexual activity: None   Other Topics Concern   ? None   Social History Narrative   ? None     Current Outpatient Medications   Medication Sig   ? acetaminophen (TYLENOL) 160 mg/5 mL solution 650 mg by G-tube route every 4 (four) hours as needed for fever or pain.          ? aspirin 81 mg chewable tablet 81 mg by G-tube route daily.   ? atorvastatin (LIPITOR) 40 MG tablet 40 mg by G-tube route at bedtime.   ? baclofen (LIORESAL) 20 MG tablet 20 mg by G-tube route 3 (three) times a day.   ? bromocriptine (PARLODEL) 2.5 mg tablet 2.5 mg by G-tube route 2 (two) times a day.   ? chlorhexidine (PERIDEX) 0.12 % solution Apply 15 mL to the mouth or throat 2 (two) times a day.   ? citalopram (CELEXA) 10 MG tablet 10 mg by G-tube route daily.   ? donepezil (ARICEPT) 5 MG tablet 5 mg by G-tube route daily.   ? emollient base  (EMOLLIENT TOP) Apply 2 g topically daily.   ? enoxaparin (LOVENOX) 40 mg/0.4 mL syringe Inject 40 mg under the skin daily.   ? heparin sodium,porcine (HEPARIN, PORCINE,) 5,000 unit/mL injection Inject 5,000 Units under the skin every 8 (eight) hours.   ? insulin glargine (LANTUS) 100 unit/mL injection Inject 8 Units under the skin at bedtime.   ? ipratropium-albuterol (DUO-NEB) 0.5-2.5 mg/3 mL nebulizer Inhale 3 mL 4 (four) times a day as needed.   ? ticagrelor (BRILINTA) 90 mg Tab 90 mg 2 (two) times a day. Via G-tube       REVIEW OF SYSTEM:  Unable to complete due to neurologic status of patient.     PHYSICAL EXAM:   General appearance: alert, appears stated age and cooperative--however nonverbal and does not communicate  HEENT: Head is normocephalic with normal hair distribution. No evidence of trauma. Ears: Without lesions or deformity. No acute purulent discharge. Eyes: Conjunctivae pink with no scleral icterus or erythema. Nose: Normal mucosa and septum. Oropharnyx: mmm, no lesions present.  Lungs: poor inspiratory effort, clear to auscultation bilaterally, respirations without effort  Heart: regular rate and rhythm, S1, S2 normal, no murmur, click, rub or gallop  Abdomen: soft, non-tender; bowel sounds normal; no masses,  no organomegaly, g-tube in place and infusing  Extremities: extremities normal, atraumatic, no cyanosis or edema  Pulses: 2+ and symmetric  Skin: Skin color, texture, turgor normal. No rashes or lesions  Neurologic: nonverbal, left sided hemiplegia, aphasia, immobile  Psych: pleasant      LABS:   None today.     ASSESSMENT:    No diagnosis found.    PLAN:       Physical Deconditioning due to Cardiogenic Shock, Intracranial Hemorrhage, CVA- chronic, stable  -Continue PT/OT and other therapies as per care plan.  -Encouraged good nutrition and mobility as tolerated.  -Discussed care plan and expected course of stay.   -Continue to follow-up per routine schedule or sooner if needed.     CVA  with Hemiparesis, Dysphagia, Aphasia- chronic, stable  -PT/OT as directed  -White board with dry erase marker for communication.   -Communication board.     Dysphagia- chronic, stable  -SLP to work with patient--consult, eval & treat.   -Tube feeding, increase to rate of 56 cc/hr continuously.     Otherwise continue current care plan for all other chronic medical conditions, as they are stable. Encouraged patient to engage in healthy lifestyle behaviors such as engaging in social activities, exercising (PT/OT), eating well, and following care plan. Follow up for routine check-up, or sooner if needed. Will continue to monitor patient and work with nursing staff collaboratively to work toward positive patient outcomes.    Patient evaluated by Heather Aj CNP in conjunction with Karen Galvin CNP, who scribed note. Heather Aj CNP, then edited note. Plan agreed upon by patient, float nurse practitioner and nurse practitioner.     Electronically signed by: Heather Aj CNP

## 2021-06-18 NOTE — LETTER
Letter by Heather Aj CNP at      Author: Heather Aj CNP Service: -- Author Type: --    Filed:  Encounter Date: 2/11/2019 Status: (Other)         Patient: Osvaldo Rollins   MR Number: [id number]   YOB: 1960   Date of Visit: 2/11/2019     [facility]    Facility:   [facility] [[id number]]   Code Status: FULL CODE and POLST AVAILABLE      CHIEF COMPLAINT/REASON FOR VISIT:  Chief Complaint   Patient presents with   ? Review Of Multiple Medical Conditions     physical deconditioning, s/p CVA, ICH, dysphagia, aphasia       HISTORY:      HPI: Osvaldo is a 58 y.o. male with complex past medical history including CVA, intracranial hemorrhage, aphasia, dysphagia, acute respiratory failure with hypoxia, CAD, cardiogenic shock, left hemiparesis (other's outlined in pmh table) presenting to [facility] for rehabilitation s/p CVA. He was hospitalized at [facility] on 11/1/2018 for a STEMI and L MCA occlusion, he was discharged on 11/21/2018 and went to [facility] where he was to undergo rehabilitative therapies. Discharging provider from [facility] summarized hospitalization in previous notes.  Has made some progress since transfer to [facility] on 11/21/18. Has had improvement to function of his RUE, is able to communicate via handwriting. Was initially on ventilator support, but gradually improved; eventually decannulated 12/18/18.  Hospital stay summarized in previous notes.    Mr. Rollins is being seen today for a review of multiple medical conditions. Sister Lee Ann is here at bedside and states she is upset that Osvaldo has not been getting up on regular intervals and times. She did discuss with nurse manager and asked myself to do the same. She states that otherwise he has been doing well. They continue to work with Osvaldo for multiple hours each day. She feels he is getting stronger and more active. Today Osvaldo was able to respond to more  commands that he has been able to in previous. He squeezed my hand with his right hand, gave me a high five, was able to push his right foot down and pull it up. He is still unable to use his left side or his mouth. He does not have control of his mouth and has uncontrollable drool from his mouth. Family is trying to work with him on mouth control. Unable to complete ROS due to neurological status but no concerns from family or nursing staff at this time.    Past Medical History:   Diagnosis Date   ? Acute respiratory failure with hypoxia (H)    ? Aphasia    ? Aspiration pneumonia (H)    ? CAD (coronary artery disease)    ? Cardiogenic shock (H)    ? CVA (cerebral vascular accident) (H)    ? Dysphagia    ? HLD (hyperlipidemia)    ? Hyperglycemia    ? Hypotension    ? Intracranial hemorrhage (H)    ? Late effects of cerebral ischemic stroke    ? Left hemiparesis (H)    ? Stroke (H)              Family History   Problem Relation Age of Onset   ? Deep vein thrombosis Brother    ? Stroke Maternal Grandfather      Social History     Socioeconomic History   ? Marital status: Single     Spouse name: None   ? Number of children: None   ? Years of education: None   ? Highest education level: None   Social Needs   ? Financial resource strain: None   ? Food insecurity - worry: None   ? Food insecurity - inability: None   ? Transportation needs - medical: None   ? Transportation needs - non-medical: None   Occupational History   ? None   Tobacco Use   ? Smoking status: Never Smoker   ? Smokeless tobacco: Never Used   Substance and Sexual Activity   ? Alcohol use: Yes   ? Drug use: None   ? Sexual activity: None   Other Topics Concern   ? None   Social History Narrative   ? None     Current Outpatient Medications   Medication Sig   ? acetaminophen (TYLENOL) 160 mg/5 mL solution 650 mg by G-tube route every 4 (four) hours as needed for fever or pain.          ? aspirin 81 mg chewable tablet 81 mg by G-tube route daily.   ?  atorvastatin (LIPITOR) 40 MG tablet 40 mg by G-tube route at bedtime.   ? baclofen (LIORESAL) 20 MG tablet 20 mg by G-tube route 3 (three) times a day.   ? bromocriptine (PARLODEL) 2.5 mg tablet 2.5 mg by G-tube route 2 (two) times a day.   ? chlorhexidine (PERIDEX) 0.12 % solution Apply 15 mL to the mouth or throat 2 (two) times a day.   ? citalopram (CELEXA) 10 MG tablet 10 mg by G-tube route daily.   ? donepezil (ARICEPT) 5 MG tablet 5 mg by G-tube route daily.   ? emollient base (EMOLLIENT TOP) Apply 2 g topically daily.   ? enoxaparin (LOVENOX) 40 mg/0.4 mL syringe Inject 40 mg under the skin daily.   ? heparin sodium,porcine (HEPARIN, PORCINE,) 5,000 unit/mL injection Inject 5,000 Units under the skin every 8 (eight) hours.   ? insulin glargine (LANTUS) 100 unit/mL injection Inject 8 Units under the skin at bedtime.   ? ipratropium-albuterol (DUO-NEB) 0.5-2.5 mg/3 mL nebulizer Inhale 3 mL 4 (four) times a day as needed.   ? ticagrelor (BRILINTA) 90 mg Tab 90 mg 2 (two) times a day. Via G-tube       REVIEW OF SYSTEM:  Unable to complete due to neurologic status of patient.     PHYSICAL EXAM:   General appearance: alert, appears stated age and cooperative--however nonverbal and does not communicate  HEENT: Head is normocephalic with normal hair distribution. No evidence of trauma. Ears: Without lesions or deformity. No acute purulent discharge. Eyes: Conjunctivae pink with no scleral icterus or erythema. Nose: Normal mucosa and septum. Oropharnyx: mmm, no lesions present.  Lungs: poor inspiratory effort, clear to auscultation bilaterally, respirations without effort  Heart: regular rate and rhythm, S1, S2 normal, no murmur, click, rub or gallop  Abdomen: soft, non-tender; bowel sounds normal; no masses,  no organomegaly, g-tube in place and infusing  Extremities: extremities normal, atraumatic, no cyanosis or edema  Pulses: 2+ and symmetric  Skin: Skin color, texture, turgor normal. No rashes or  lesions  Neurologic: nonverbal, left sided hemiplegia, aphasia, immobile  Psych: pleasant      LABS:   None today.     ASSESSMENT:      ICD-10-CM    1. Physical deconditioning R53.81    2. Left hemiparesis (H) G81.94    3. Intracranial hemorrhage (H) I62.9    4. Dysphagia, unspecified type R13.10    5. Aphasia R47.01    6. Cerebrovascular accident (CVA), unspecified mechanism (H) I63.9        PLAN:     Care plan reviewed and remains appropriate. Continue therapies. Discussed concerns with Candace ETIENNE Nurse Manager.     Physical Deconditioning due to Cardiogenic Shock, Intracranial Hemorrhage, CVA- chronic, stable  -Continue PT/OT and other therapies as per care plan.  -Encouraged good nutrition and mobility as tolerated.  -Discussed care plan and expected course of stay.   -Continue to follow-up per routine schedule or sooner if needed.     CVA with Hemiparesis, Dysphagia, Aphasia- chronic, stable  -PT/OT as directed  -White board with dry erase marker for communication.   -Communication board.     Dysphagia- chronic, stable  -SLP to work with patient--consult, eval & treat.   -Tube feeding.    Otherwise continue current care plan for all other chronic medical conditions, as they are stable. Encouraged patient to engage in healthy lifestyle behaviors such as engaging in social activities, exercising (PT/OT), eating well, and following care plan. Follow up for routine check-up, or sooner if needed. Will continue to monitor patient and work with nursing staff collaboratively to work toward positive patient outcomes.    Electronically signed by: Heather Aj CNP

## 2021-06-19 NOTE — LETTER
Letter by Lesley Schwarz MD at      Author: Lesley Schwarz MD Service: -- Author Type: --    Filed:  Encounter Date: 4/1/2019 Status: (Other)         Patient: Osvaldo Rollins   MR Number: [id number]   YOB: 1960   Date of Visit: 4/1/2019     [facility]    Facility:   [facility] [[id number]]     Code Status: FULL CODE      CHIEF COMPLAINT/REASON FOR VISIT:  Chief Complaint   Patient presents with   ? Review Of Multiple Medical Conditions     CVA / face to face for wheelchair and hoapital bed       HISTORY:      HPI: Osvaldo is a 58 y.o. male with a history of stroke with left hemiparesis, intracranial hemorrhage, aphasia, dysphasia, acute respiratory failure, coronary artery disease with cardiogenic shock,.    He had had a stroke in 2011 with residual deficits, infero-lateral STEMI November 2018 with PCI x2, intra-aortic balloon pump for cardiogenic shock, and then a stroke during the cardiac procedure.  He had a thrombectomy of the left middle cerebral artery.  The had subsequent right middle cerebral artery stroke dense left hemiplegia.    He was at [facility].  He had some improvement in right upper extremity function, has some ability to write on a white board.N.p.o. with feedings per tube    Transferred to [facility]. Per Nursing and Social Service, family members wants to take him home and will take turns being with him.    UPDATE:  Therapy has been working with him to roll side-to-side in the bed.  On 3/31 on the evening shift, he rolled his head and torso out of bed.  He was found with his head on the bed, his legs were still on the mattress.  He stated he was trying to rollover.  His sister was upset, wanted to know how it happened.  I discussed with her that when we order the hospital bed we could order more of a railing than the cane rail used in TCU.    Face-to-face is done today for an electric hospital bed, and a standard  manual wheelchair (see below).  Sister is upset that therapy says he does not need a Giudi chair, he intends to call [employer] his insurance company herself to lobby for getting them to pay for one.  We informed her that we can order equipment that is not justified, that it would constitute fraud.      Past Medical History:   Diagnosis Date   ? Acute respiratory failure with hypoxia (H)    ? Aphasia    ? Aspiration pneumonia (H)    ? CAD (coronary artery disease)    ? Cardiogenic shock (H)    ? CVA (cerebral vascular accident) (H)    ? Dysphagia    ? HLD (hyperlipidemia)    ? Hyperglycemia    ? Hypotension    ? Intracranial hemorrhage (H)    ? Late effects of cerebral ischemic stroke    ? Left hemiparesis (H)    ? Stroke (H)                    Review of Systems   Nonverbal, did smile.    Did very well writing on the white board today  He was less engaged when his sister came and was upset about the DME and fall    Blood pressure 96/66, pulse 61, temperature 96.6  F (35.9  C), resp. rate 16, SpO2 95 %.          Physical Exam  Constitutional: alert, middle-aged -American male nonverbal  Membranes moist  Lungs: poor inspiratory effort, clear to auscultation bilaterally,   Heart: S1S2 regular rate and rhythm  Abdomen: soft, non-tender; bowel sounds normal; no masses,  no organomegaly, g-tube in place, site without inflammation or discharge   Extremities: extremities normal, brace on left arm, no  edema  Skin:  No rashes or lesions  Neurologic: nonverbal, left sided hemiplegia, aphasia, drooling. Articulated brace on left arm  Psych: pleasant    Per therapy update: Transfers with max assist of 2, maximum assistance in all ADLs.  He has been more resistant lately to the therapy.  He is not making significant progress.  100 th day will be on April 10    LABS:   No new labs    ASSESSMENT / PLAN :      ICD-10-CM    1. Cerebrovascular accident (CVA), unspecified mechanism (H) ( three CVAs) I63.9 Family may try caring but  he is heavy care, totally dependent for all ADLS; would usually expect LTC placement. Safe dc plan must be assured.Aricept, Parlodel   2. Coronary artery disease involving native coronary artery of native heart with angina pectoris (H) I25.119 Not showing signs symptoms of ischemia, continue to monitor   3. STEMI involving left circumflex coronary artery (H): TESSA I21.21 ASA, Lipitor, Brilinta (aslso for CVAs)   4. Left hemiparesis G81.94 Will likely  not recover use of left side. Baclofen for spasm.   5 Other dysphagia: NPO, feeding tube R13.19 Working well.       FACE TO FACE  4/1/2019    ELECTRIC HOSPITAL BED  Diagnosis: CVA with left hemiparesis, weakness, aspiration pneumonia  Height 72 inches, weight 136.6 pounds  1.  Patient has a medical condition that requires positioning not feasible with an ordinary bed.  2.  He requires positioning of the body to prevent pain in ways not feasible with an ordinary bed.  3.  Patient requires a head of the bed to be elevated more than 30 degrees due to aspiration problems and tube feedings.  Cannot hold his trunk on pillows or wedges.  4: He does not require traction equipment  5.  He requires a bed height different than fixed height hospital bed for transfers,  6.  He requires frequent body changes to prevent pressure sores.  7.  He needs halflength siderails, as he is rolled himself out of bed.  Estimated length of need: Lifetime (99 months).    FACE TO FACE    4/1/2019  Standard manual wheel chair    Diagnosis: CVA with left hemiparesis, weakness  Height 72 inches, weight 136.6 pounds  Osvaldo will use the wheelchair 8+ hours per day, all times when out of bed  2.  Wheelchair will be used within his home  3.  He can propel the chair with his right arm and leg  4.  He does not have skin breakdown but is at risk for breakdown due to immobility.  Pressure relief cushion will be ordered  5.  He is not receiving any advanced wound healing therapy.    Wheelchair specifics: 18  inch wide, 18 inch depth, full armrests with nylon armrest bolster on the left side, chair height high position, elevating leg rests, full reclining high back with head rest extension, rear anti-tippers.  Equal gel pressure relief cushion or similar (18 inch x 18 inch)    Wheelchair prescription:  1: Patient has mobility restrictions that significantly impair his ability to participate in MR ADLs such as toileting, feeding, dressing, grooming, and bathing.  2: His mobility limitations cannot be resolved by the use of a fitted cane or walker  3 use of a manual wheelchair will significantly improve his ability to participate in MR ADLs and he will use it on a regular basis at home.  4: He is nonverbal, so cannot express willingness to use the manual wheelchair in the home.  Family caretakers needed and he is able to use it  5: He has upper extremity function and capabilities on the right side with supervision  6: He has a caregiver available, willing, and able to provide assistance  7: We are not ordering mesfin-wheelchair 8.  We are not ordering a light weight wheelchair 9.  We are not ordering a high strength lightweight wheelchair.  Estimated length of need lifetime (99 months)      He also requires of pivot transfer disc 15 inch    Total time 40 minutes, > 50 % with pt and sister explaining DME orders, regarding fall. Also including 10 minutes with Social Service and sister regarding sister's request for DME that is not indicated by therapy, and discussion with unit RN manager about the DME issues.    Electronically signed by: Lesley Schwarz MD   NPI: [id number]

## 2021-06-19 NOTE — LETTER
Letter by Lesley Schwarz MD at      Author: Lesley Schwarz MD Service: -- Author Type: --    Filed:  Encounter Date: 4/8/2019 Status: (Other)         Patient: Osvaldo Rollins   MR Number: [id number]   YOB: 1960   Date of Visit: 4/8/2019     [facility]    Facility:   [facility]CU [[id number]]     Code Status: FULL CODE      CHIEF COMPLAINT/REASON FOR VISIT:  Chief Complaint   Patient presents with   ? Review Of Multiple Medical Conditions     multiple CVAs       HISTORY:      HPI: Osvaldo is a 58 y.o. male with a history of stroke with left hemiparesis, intracranial hemorrhage, aphasia, dysphasia, acute respiratory failure, coronary artery disease with cardiogenic shock,.    He had had a stroke in 2011 with residual deficits, infero-lateral STEMI November 2018 with PCI x2, intra-aortic balloon pump for cardiogenic shock, and then a stroke during the cardiac procedure.  He had a thrombectomy of the left middle cerebral artery.  The had subsequent right middle cerebral artery stroke dense left hemiplegia.    He was at [facility].  He had some improvement in right upper extremity function, has some ability to write on a white board.N.p.o. with feedings per tube    Transferred to [facility]. Per Nursing and Social Service, family members wants to take him home and will take turns being with him.      Therapy has been working with him to roll side-to-side in the bed.  On 3/31 on the evening shift, he rolled his head and torso out of bed.  He was found with his head on the bed, his legs were still on the mattress.  He stated he was trying to rollover.    I discussed with her that when we order the hospital bed we could order more of a railing than the cane rail used in TCU.    Face-to-face was done last week for an electric hospital bed, and a standard manual wheelchair (see below)    UPDATE:  .  Therapy update today: He is assist of 2 with a  standing lift.  Nurse was concerned about redness around the G-tube  He was to discharge in a couple days, the sister wants to wait until 4/16 for equipment tissues      Past Medical History:   Diagnosis Date   ? Acute respiratory failure with hypoxia (H)    ? Aphasia    ? Aspiration pneumonia (H)    ? CAD (coronary artery disease)    ? Cardiogenic shock (H)    ? CVA (cerebral vascular accident) (H)    ? Dysphagia    ? HLD (hyperlipidemia)    ? Hyperglycemia    ? Hypotension    ? Intracranial hemorrhage (H)    ? Late effects of cerebral ischemic stroke    ? Left hemiparesis (H)    ? Stroke (H)                    Review of Systems   Nonverbal, did smile.    Wrote a bit on the white board, however the writing was not in response to questions asked      Blood pressure 95/62, pulse 75, temperature 96.6  F (35.9  C), resp. rate 18, SpO2 95 %.            Physical Exam  Constitutional: alert,thin, middle-aged -American male nonverbal  Lungs: clear to auscultation bilaterally,   Heart: S1S2 regular rate and rhythm  Abdomen: soft, non-tender; bowel sounds normal; no masses,  no organomegaly, g-tube in place, site shows hypertrophic growth of tract lining tissue, easily treated with silver nitrate sticks, but not available. Abdominal wall without induration, warmth. No reaction to palpation around the tube.  Extremities: extremities normal, brace on left arm, no  edema  Skin:  No rashes   Neurologic: nonverbal, left sided hemiplegia, aphasia, drooling. Articulated brace on left arm.  Psych: pleasant    Per therapy update: Transfers with max assist of 2, maximum assistance in all ADLs.   He is not making significant progress.  100 th day will be on April 10. Family plans to take him to his brother's house and take care of him.    LABS:   No new labs    ASSESSMENT / PLAN :       ICD-10-CM    1. Cerebrovascular accident (CVA), unspecified mechanism (H) I63.9 Has plateaued. DC soon as 100th day on 4/10/19   2. Left  hemiparesis (H) G81.94 Total assist 2 with ADLs, will be a challenge for family to give 24 hour care.  Baclofen   3. STEMI involving left circumflex coronary artery (H): TESSA I21.21 No sign of decompensation or anginal equivalent.  On Lipitor, Brilinta, aspirin   4.  dysphagia and aphasia post CVA R13.19 PEG site fine, TF for nourishment       Electronically signed by: Lesley Schwarz MD

## 2021-06-19 NOTE — LETTER
Letter by Heather Aj CNP at      Author: Heather Aj CNP Service: -- Author Type: --    Filed:  Encounter Date: 3/20/2019 Status: (Other)         Patient: Osvaldo Rollins   MR Number: [id number]   YOB: 1960   Date of Visit: 3/20/2019     [facility]    Facility:   [facility] [[id number]]   Code Status: FULL CODE and POLST AVAILABLE      CHIEF COMPLAINT/REASON FOR VISIT:  Chief Complaint   Patient presents with   ? Review Of Multiple Medical Conditions     CVA, cardiogenic shock, physical deconditioning, left hemiparesis, intracranial hemorrhage, dysphagia, and aphasia       HISTORY:      HPI: Osvaldo is a 58 y.o. male with complex past medical history including CVA, intracranial hemorrhage, aphasia, dysphagia, acute respiratory failure with hypoxia, CAD, cardiogenic shock, left hemiparesis (other's outlined in pmh table) presenting to [facility] for rehabilitation s/p CVA. He was hospitalized at [facility] on 11/1/2018 for a STEMI and L MCA occlusion, he was discharged on 11/21/2018 and went to [facility] where he was to undergo rehabilitative therapies. Discharging provider from [facility] summarized hospitalization in previous notes.  Has made some progress since transfer to [facility] on 11/21/18. Has had improvement to function of his RUE, is able to communicate via handwriting. Was initially on ventilator support, but gradually improved; eventually decannulated 12/18/18.  Hospital stay summarized in previous notes.    Today Osvaldo is being evaluated for a routine review of multiple medical problems. Osvaldo has been more and more interactive per nursing report and during today's visit Osvaldo was alert but not able to use communication board.  Nursing staff denied any specific concerns for that patient at this time.    Past Medical History:   Diagnosis Date   ? Acute respiratory failure with hypoxia (H)    ? Aphasia    ? Aspiration  pneumonia (H)    ? CAD (coronary artery disease)    ? Cardiogenic shock (H)    ? CVA (cerebral vascular accident) (H)    ? Dysphagia    ? HLD (hyperlipidemia)    ? Hyperglycemia    ? Hypotension    ? Intracranial hemorrhage (H)    ? Late effects of cerebral ischemic stroke    ? Left hemiparesis (H)    ? Stroke (H)              Family History   Problem Relation Age of Onset   ? Deep vein thrombosis Brother    ? Stroke Maternal Grandfather      Social History     Socioeconomic History   ? Marital status: Single     Spouse name: None   ? Number of children: None   ? Years of education: None   ? Highest education level: None   Occupational History   ? None   Social Needs   ? Financial resource strain: None   ? Food insecurity:     Worry: None     Inability: None   ? Transportation needs:     Medical: None     Non-medical: None   Tobacco Use   ? Smoking status: Never Smoker   ? Smokeless tobacco: Never Used   Substance and Sexual Activity   ? Alcohol use: Yes   ? Drug use: None   ? Sexual activity: None   Lifestyle   ? Physical activity:     Days per week: None     Minutes per session: None   ? Stress: None   Relationships   ? Social connections:     Talks on phone: None     Gets together: None     Attends Mandaen service: None     Active member of club or organization: None     Attends meetings of clubs or organizations: None     Relationship status: None   ? Intimate partner violence:     Fear of current or ex partner: None     Emotionally abused: None     Physically abused: None     Forced sexual activity: None   Other Topics Concern   ? None   Social History Narrative   ? None     Current Outpatient Medications   Medication Sig   ? acetaminophen (TYLENOL) 160 mg/5 mL solution 650 mg by G-tube route every 4 (four) hours as needed for fever or pain.          ? aspirin 81 mg chewable tablet 81 mg by G-tube route daily.   ? atorvastatin (LIPITOR) 40 MG tablet 40 mg by G-tube route at bedtime.   ? baclofen (LIORESAL) 20  MG tablet 20 mg by G-tube route 3 (three) times a day.   ? bromocriptine (PARLODEL) 2.5 mg tablet 2.5 mg by G-tube route 2 (two) times a day.   ? chlorhexidine (PERIDEX) 0.12 % solution Apply 15 mL to the mouth or throat 2 (two) times a day.   ? citalopram (CELEXA) 10 MG tablet 10 mg by G-tube route daily.   ? donepezil (ARICEPT) 5 MG tablet 5 mg by G-tube route daily.   ? emollient base (EMOLLIENT TOP) Apply 2 g topically daily.   ? enoxaparin (LOVENOX) 40 mg/0.4 mL syringe Inject 40 mg under the skin daily.   ? heparin sodium,porcine (HEPARIN, PORCINE,) 5,000 unit/mL injection Inject 5,000 Units under the skin every 8 (eight) hours.   ? insulin glargine (LANTUS) 100 unit/mL injection Inject 8 Units under the skin at bedtime.   ? ipratropium-albuterol (DUO-NEB) 0.5-2.5 mg/3 mL nebulizer Inhale 3 mL 4 (four) times a day as needed.   ? ticagrelor (BRILINTA) 90 mg Tab 90 mg 2 (two) times a day. Via G-tube       REVIEW OF SYSTEM:  Per HPI.     PHYSICAL EXAM:   General appearance: alert, appears stated age and cooperative  HEENT: Head is normocephalic with normal hair distribution. No evidence of trauma. Ears: Without lesions or deformity. No acute purulent discharge. Eyes: Conjunctivae pink with no scleral icterus or erythema. Nose: Normal mucosa and septum. Oropharnyx: mmm, no lesions present.  Lungs: clear to auscultation bilaterally, respirations without effort  Heart: regular rate and rhythm, S1, S2 normal, no murmur, click, rub or gallop  Abdomen: soft, non-tender; bowel sounds normal; no masses,  no organomegaly, g-tube in place and infusing  Extremities: extremities normal, atraumatic, no cyanosis or edema  Pulses: 2+ and symmetric  Skin: Skin color, texture, turgor normal. No rashes or lesions  Neurologic: nonverbal, left sided hemiplegia, aphasia, immobile, will attempt to follow commands and is able to use right hand and foot, able to communicate well with white board per nursing staff.  Psych: pleasant       LABS:   None today.     ASSESSMENT:      ICD-10-CM    1. Cerebrovascular accident (CVA), unspecified mechanism (H) I63.9    2. Cardiogenic shock (H) R57.0    3. Physical deconditioning R53.81    4. Left hemiparesis (H) G81.94    5. Intracranial hemorrhage (H) I62.9    6. Dysphagia, unspecified type R13.10    7. Aphasia R47.01        PLAN:     No changes to care plan warranted. Care plan reviewed and remains appropriate. At this time plan is for patient to use all 100 days of therapy. Care conference planned for early next week to initiate further planning for discharge. Continue to improve and conversation is markedly improved since last visit.     Physical Deconditioning due to Cardiogenic Shock, Intracranial Hemorrhage, CVA- chronic, stable  -Continue PT/OT and other therapies as per care plan.  -Encouraged good nutrition and mobility as tolerated.  -Discussed care plan and expected course of stay.   -Continue to follow-up per routine schedule or sooner if needed.     CVA with Hemiparesis, Dysphagia, Aphasia- chronic, stable  -PT/OT as directed  -White board with dry erase marker for communication.   -Communication board.     Dysphagia- chronic, stable  -SLP to work with patient--consult, eval & treat.   -Tube feeding.    Otherwise continue current care plan for all other chronic medical conditions, as they are stable. Encouraged patient to engage in healthy lifestyle behaviors such as engaging in social activities, exercising (PT/OT), eating well, and following care plan. Follow up for routine check-up, or sooner if needed. Will continue to monitor patient and work with nursing staff collaboratively to work toward positive patient outcomes.    Electronically signed by: Heather Aj CNP    Patient evaluated by Heather Aj CNP in conjunction with Karen Galvin CNP, who created note. Heather Aj CNP, then edited note. Plan agreed upon by patient and nurse practitioner.

## 2021-06-19 NOTE — LETTER
Letter by Lesley Schwarz MD at      Author: Lesley Schwarz MD Service: -- Author Type: --    Filed:  Encounter Date: 3/25/2019 Status: (Other)         Patient: Osvaldo Rollins   MR Number: [id number]   YOB: 1960   Date of Visit: 3/25/2019     [facility]    Facility:   [facility] T[facility] [[id number]]     Code Status: FULL CODE      CHIEF COMPLAINT/REASON FOR VISIT:  Chief Complaint   Patient presents with   ? Review Of Multiple Medical Conditions     CVA       HISTORY:      HPI: Osvaldo is a 58 y.o. male with a history of stroke with left hemiparesis, intracranial hemorrhage, aphasia, dysphasia, acute respiratory failure, coronary artery disease with cardiogenic shock,.    He had had a stroke in 2011 with residual deficits, infero-lateral STEMI November 2018 with PCI x2, intra-aortic balloon pump for cardiogenic shock, and then a stroke during the cardiac procedure.  He had a thrombectomy of the left middle cerebral artery.  The had subsequent right middle cerebral artery stroke dense left hemiplegia.    He was at [facility].  He had some improvement in right upper extremity function, has some ability to use a spelling board, and handwriting although it is not particularly legible.  N.p.o. with feedings per tube    Transferred to [facility]. Per Nursing and Social Service, family members wants to take him home and will take turns being with him.    Past Medical History:   Diagnosis Date   ? Acute respiratory failure with hypoxia (H)    ? Aphasia    ? Aspiration pneumonia (H)    ? CAD (coronary artery disease)    ? Cardiogenic shock (H)    ? CVA (cerebral vascular accident) (H)    ? Dysphagia    ? HLD (hyperlipidemia)    ? Hyperglycemia    ? Hypotension    ? Intracranial hemorrhage (H)    ? Late effects of cerebral ischemic stroke    ? Left hemiparesis (H)    ? Stroke (H)                    Review of Systems   Nonverbal, did smile.  No attempt  at gestures, no use of word board    Blood pressure 141/80, pulse 80, temperature 97.6  F (36.4  C), resp. rate 18, SpO2 97 %.      Physical Exam  Constitutional: alert, middle-aged -American male nonverbal  Membranes moist  Lungs: poor inspiratory effort, clear to auscultation bilaterally,   Heart: S1S2 regular rate and rhythm  Abdomen: soft, non-tender; bowel sounds normal; no masses,  no organomegaly, g-tube in place, site without inflammation or discharge   Extremities: extremities normal, brace on left arm, no  edema  Skin:  No rashes or lesions  Neurologic: nonverbal, left sided hemiplegia, aphasia  Psych: pleasant    Per therapy update: Transfers with max assist of 2, maximum assistance in all ADLs.  He has been more resistant lately to the therapy.  He is not making significant progress.  100 th day will be on April 10    LABS:   No new labs    ASSESSMENT / PLAN :      ICD-10-CM    1. Cerebrovascular accident (CVA), unspecified mechanism (H) ( three CVAs) I63.9 Family may try caring but he is heavy care, totally dependent for all ADLS; would usually expect LTC placement. Safe dc plan must be assured.   2. Coronary artery disease involving native coronary artery of native heart with angina pectoris (H) I25.119 Not showing signs symptoms of ischemia, continue to monitor   3. STEMI involving left circumflex coronary artery (H): TESSA I21.21    4. Left hemiparesis G81.94 Will likely  not recover use of left side   5 Other dysphagia: NPO, feeding tube R13.19 Working well.         Electronically signed by: Lesley Schwarz MD

## 2021-06-23 NOTE — PROGRESS NOTES
[facility]    Facility:   [facility] [[id number]]   Code Status: FULL CODE and POLST AVAILABLE      CHIEF COMPLAINT/REASON FOR VISIT:  Chief Complaint   Patient presents with     Review Of Multiple Medical Conditions       HISTORY:      HPI: Osvaldo is a 58 y.o. male with complex past medical history including CVA, intracranial hemorrhage, aphasia, dysphagia, acute respiratory failure with hypoxia, CAD, cardiogenic shock, left hemiparesis (other's outlined in pmh table) presenting to [facility] for rehabilitation s/p CVA. He was hospitalized at [facility] on 11/1/2018 for a STEMI and L MCA occlusion, he was discharged on 11/21/2018 and went to [facility] where he was to undergo rehabilitative therapies. Discharging provider from [facility] summarized hospitalization in previous notes.  Has made some progress since transfer to [facility] on 11/21/18. Has had improvement to function of his RUE, is able to communicate via handwriting. Was initially on ventilator support, but gradually improved; eventually decannulated 12/18/18.  Hospital stay summarized in previous notes.    Mr. Rollins is being seen today for a review of multiple medical conditions.  He is laying in bed alert and nonverbal.  He is not attempting to communicate with his pen and paper at this visit.  The staff reported that his tubefeeding has been going well.  He does not have any skin concerns at this visit.  The patient is able to assist with turning onto his left side according to nursing staff.  The nursing staff and the patient's sister, Lee Ann, do not have any new concerns for the patient at this visit.      Past Medical History:   Diagnosis Date     Acute respiratory failure with hypoxia (H)      Aphasia      Aspiration pneumonia (H)      CAD (coronary artery disease)      Cardiogenic shock (H)      CVA (cerebral vascular accident) (H)      Dysphagia      HLD (hyperlipidemia)       Hyperglycemia      Hypotension      Intracranial hemorrhage (H)      Late effects of cerebral ischemic stroke      Left hemiparesis (H)      Stroke (H)              Family History   Problem Relation Age of Onset     Deep vein thrombosis Brother      Stroke Maternal Grandfather      Social History     Socioeconomic History     Marital status: Single     Spouse name: None     Number of children: None     Years of education: None     Highest education level: None   Social Needs     Financial resource strain: None     Food insecurity - worry: None     Food insecurity - inability: None     Transportation needs - medical: None     Transportation needs - non-medical: None   Occupational History     None   Tobacco Use     Smoking status: Never Smoker     Smokeless tobacco: Never Used   Substance and Sexual Activity     Alcohol use: Yes     Drug use: None     Sexual activity: None   Other Topics Concern     None   Social History Narrative     None     Current Outpatient Medications   Medication Sig     acetaminophen (TYLENOL) 160 mg/5 mL solution 650 mg by G-tube route every 4 (four) hours as needed for fever or pain.            aspirin 81 mg chewable tablet 81 mg by G-tube route daily.     atorvastatin (LIPITOR) 40 MG tablet 40 mg by G-tube route at bedtime.     baclofen (LIORESAL) 20 MG tablet 20 mg by G-tube route 3 (three) times a day.     bromocriptine (PARLODEL) 2.5 mg tablet 2.5 mg by G-tube route 2 (two) times a day.     chlorhexidine (PERIDEX) 0.12 % solution Apply 15 mL to the mouth or throat 2 (two) times a day.     citalopram (CELEXA) 10 MG tablet 10 mg by G-tube route daily.     donepezil (ARICEPT) 5 MG tablet 5 mg by G-tube route daily.     emollient base (EMOLLIENT TOP) Apply 2 g topically daily.     enoxaparin (LOVENOX) 40 mg/0.4 mL syringe Inject 40 mg under the skin daily.     heparin sodium,porcine (HEPARIN, PORCINE,) 5,000 unit/mL injection Inject 5,000 Units under the skin every 8 (eight) hours.      insulin glargine (LANTUS) 100 unit/mL injection Inject 8 Units under the skin at bedtime.     ipratropium-albuterol (DUO-NEB) 0.5-2.5 mg/3 mL nebulizer Inhale 3 mL 4 (four) times a day as needed.     ticagrelor (BRILINTA) 90 mg Tab 90 mg 2 (two) times a day. Via G-tube       REVIEW OF SYSTEM:  Unable to complete due to neurologic status of patient.     PHYSICAL EXAM:   General appearance: alert, appears stated age and cooperative--however nonverbal and does not communicate  HEENT: Head is normocephalic with normal hair distribution. No evidence of trauma. Ears: Without lesions or deformity. No acute purulent discharge. Eyes: Conjunctivae pink with no scleral icterus or erythema. Nose: Normal mucosa and septum. Oropharnyx: mmm, no lesions present.  Lungs: poor inspiratory effort, clear to auscultation bilaterally, respirations without effort  Heart: regular rate and rhythm, S1, S2 normal, no murmur, click, rub or gallop  Abdomen: soft, non-tender; bowel sounds normal; no masses,  no organomegaly, g-tube in place and infusing  Extremities: extremities normal, atraumatic, no cyanosis or edema  Pulses: 2+ and symmetric  Skin: Skin color, texture, turgor normal. No rashes or lesions  Neurologic: nonverbal, left sided hemiplegia, aphasia, immobile  Psych: pleasant      LABS:   None today.     ASSESSMENT:    No diagnosis found.    PLAN:       Physical Deconditioning due to Cardiogenic Shock, Intracranial Hemorrhage, CVA- chronic, stable  -Continue PT/OT and other therapies as per care plan.  -Encouraged good nutrition and mobility as tolerated.  -Discussed care plan and expected course of stay.   -Continue to follow-up per routine schedule or sooner if needed.     CVA with Hemiparesis, Dysphagia, Aphasia- chronic, stable  -PT/OT as directed  -White board with dry erase marker for communication.   -Communication board.     Dysphagia- chronic, stable  -SLP to work with patient--consult, eval & treat.   -Tube feeding,  increase to rate of 56 cc/hr continuously.     Otherwise continue current care plan for all other chronic medical conditions, as they are stable. Encouraged patient to engage in healthy lifestyle behaviors such as engaging in social activities, exercising (PT/OT), eating well, and following care plan. Follow up for routine check-up, or sooner if needed. Will continue to monitor patient and work with nursing staff collaboratively to work toward positive patient outcomes.    Patient evaluated by Heather Aj CNP in conjunction with Karen Galvin CNP, who scribed note. Heather Aj CNP, then edited note. Plan agreed upon by patient, float nurse practitioner and nurse practitioner.     Electronically signed by: Heather Aj CNP

## 2021-06-23 NOTE — PROGRESS NOTES
[facility]    Facility:   [facility] [[id number]]   Code Status: FULL CODE and POLST AVAILABLE      CHIEF COMPLAINT/REASON FOR VISIT:  Chief Complaint   Patient presents with     Review Of Multiple Medical Conditions     physical deconditioning, intracranial hemorrhage, CVA, aphasia, dysphagi        HISTORY:      HPI: Osvaldo is a 58 y.o. male with complex past medical history including CVA, intracranial hemorrhage, aphasia, dysphagia, acute respiratory failure with hypoxia, CAD, cardiogenic shock, left hemiparesis (other's outlined in pmh table) presenting to [facility] for rehabilitation s/p CVA. He was hospitalized at [facility] on 11/1/2018 for a STEMI and L MCA occlusion, he was discharged on 11/21/2018 and went to [facility] where he was to undergo rehabilitative therapies. Discharging provider from [facility] summarized hospitalization in previous notes.  Has made some progress since transfer to [facility] on 11/21/18. Has had improvement to function of his RUE, is able to communicate via handwriting. Was initially on ventilator support, but gradually improved; eventually decannulated 12/18/18.  Hospital stay summarized in previous notes.    Mr. Rollins is being seen today for a review of multiple medical conditions.  He is laying in bed alert and nonverbal. He does allow for examination. He is not using his pen and paper or making any meaningful attempts at communication until he is moved to his chair and his sister comes to his side. He is then holding writers hand and squeezing it but he is not following commands. Osvaldo continues to make very little progress in therapies. Sister Lee Ann comes to his bedside daily and reports spending 6 hours working with him. She is wanting to know exactly what is happening with SLP therapies, she did ask them but did not understand. Did discuss at length. Her goal remains to bring him home, however Mr. Rollins does  not seem to be making any progress and would need 24/7 care. No other concerns from family or staff.       Past Medical History:   Diagnosis Date     Acute respiratory failure with hypoxia (H)      Aphasia      Aspiration pneumonia (H)      CAD (coronary artery disease)      Cardiogenic shock (H)      CVA (cerebral vascular accident) (H)      Dysphagia      HLD (hyperlipidemia)      Hyperglycemia      Hypotension      Intracranial hemorrhage (H)      Late effects of cerebral ischemic stroke      Left hemiparesis (H)      Stroke (H)              Family History   Problem Relation Age of Onset     Deep vein thrombosis Brother      Stroke Maternal Grandfather      Social History     Socioeconomic History     Marital status: Single     Spouse name: None     Number of children: None     Years of education: None     Highest education level: None   Social Needs     Financial resource strain: None     Food insecurity - worry: None     Food insecurity - inability: None     Transportation needs - medical: None     Transportation needs - non-medical: None   Occupational History     None   Tobacco Use     Smoking status: Never Smoker     Smokeless tobacco: Never Used   Substance and Sexual Activity     Alcohol use: Yes     Drug use: None     Sexual activity: None   Other Topics Concern     None   Social History Narrative     None     Current Outpatient Medications   Medication Sig     acetaminophen (TYLENOL) 160 mg/5 mL solution 650 mg by G-tube route every 4 (four) hours as needed for fever or pain.            aspirin 81 mg chewable tablet 81 mg by G-tube route daily.     atorvastatin (LIPITOR) 40 MG tablet 40 mg by G-tube route at bedtime.     baclofen (LIORESAL) 20 MG tablet 20 mg by G-tube route 3 (three) times a day.     bromocriptine (PARLODEL) 2.5 mg tablet 2.5 mg by G-tube route 2 (two) times a day.     chlorhexidine (PERIDEX) 0.12 % solution Apply 15 mL to the mouth or throat 2 (two) times a day.     citalopram (CELEXA)  10 MG tablet 10 mg by G-tube route daily.     donepezil (ARICEPT) 5 MG tablet 5 mg by G-tube route daily.     emollient base (EMOLLIENT TOP) Apply 2 g topically daily.     enoxaparin (LOVENOX) 40 mg/0.4 mL syringe Inject 40 mg under the skin daily.     heparin sodium,porcine (HEPARIN, PORCINE,) 5,000 unit/mL injection Inject 5,000 Units under the skin every 8 (eight) hours.     insulin glargine (LANTUS) 100 unit/mL injection Inject 8 Units under the skin at bedtime.     ipratropium-albuterol (DUO-NEB) 0.5-2.5 mg/3 mL nebulizer Inhale 3 mL 4 (four) times a day as needed.     ticagrelor (BRILINTA) 90 mg Tab 90 mg 2 (two) times a day. Via G-tube       REVIEW OF SYSTEM:  Unable to complete due to neurologic status of patient.     PHYSICAL EXAM:   General appearance: alert, appears stated age and cooperative--however nonverbal and does not communicate  HEENT: Head is normocephalic with normal hair distribution. No evidence of trauma. Ears: Without lesions or deformity. No acute purulent discharge. Eyes: Conjunctivae pink with no scleral icterus or erythema. Nose: Normal mucosa and septum. Oropharnyx: mmm, no lesions present.  Lungs: poor inspiratory effort, clear to auscultation bilaterally, respirations without effort  Heart: regular rate and rhythm, S1, S2 normal, no murmur, click, rub or gallop  Abdomen: soft, non-tender; bowel sounds normal; no masses,  no organomegaly, g-tube in place and infusing  Extremities: extremities normal, atraumatic, no cyanosis or edema  Pulses: 2+ and symmetric  Skin: Skin color, texture, turgor normal. No rashes or lesions  Neurologic: nonverbal, left sided hemiplegia, aphasia, immobile  Psych: pleasant      LABS:   None today.     ASSESSMENT:      ICD-10-CM    1. Physical deconditioning R53.81    2. Left hemiparesis (H) G81.94    3. Intracranial hemorrhage (H) I62.9    4. Dysphagia, unspecified type R13.10    5. Aphasia R47.01    6. Cerebrovascular accident (CVA), unspecified mechanism  (H) I63.9        PLAN:       Physical Deconditioning due to Cardiogenic Shock, Intracranial Hemorrhage, CVA- chronic, stable  -Continue PT/OT and other therapies as per care plan.  -Encouraged good nutrition and mobility as tolerated.  -Discussed care plan and expected course of stay.   -Continue to follow-up per routine schedule or sooner if needed.     CVA with Hemiparesis, Dysphagia, Aphasia- chronic, stable  -PT/OT as directed  -White board with dry erase marker for communication.   -Communication board.     Dysphagia- chronic, stable  -SLP to work with patient--consult, eval & treat.   -Tube feeding, increase to rate of 56 cc/hr continuously.     Otherwise continue current care plan for all other chronic medical conditions, as they are stable. Encouraged patient to engage in healthy lifestyle behaviors such as engaging in social activities, exercising (PT/OT), eating well, and following care plan. Follow up for routine check-up, or sooner if needed. Will continue to monitor patient and work with nursing staff collaboratively to work toward positive patient outcomes.    Total time of 35 minutes spent with patient, sister Lee Ann and nursing staff with greater than 50% of this time spent on review of previous records, counseling, education, and discussion of the above care plan with nursing staff, Chaundra and patient.     Electronically signed by: Heather Aj CNP

## 2021-06-23 NOTE — PROGRESS NOTES
"[facility]    Facility:   [facility] [[id number]]   Code Status: FULL CODE and POLST AVAILABLE      CHIEF COMPLAINT/REASON FOR VISIT:  Chief Complaint   Patient presents with     Review Of Multiple Medical Conditions     physical deconditioning, CVA, intracranial hemorrhage, left sided deficitis, aphasia, dysphagia       HISTORY:      HPI: Osvaldo is a 58 y.o. male with complex past medical history including CVA, intracranial hemorrhage, aphasia, dysphagia, acute respiratory failure with hypoxia, CAD, cardiogenic shock, left hemiparesis (other's outlined in pmh table) presenting to [facility] for rehabilitation s/p CVA. He was hospitalized at [facility] on 11/1/2018 for a STEMI and L MCA occlusion, he was discharged on 11/21/2018 and went to [facility] where he was to undergo rehabilitative therapies. Discharging provider from [facility] summarized hospitalization in previous notes.     Summary from [facility] discharging provider  is as follows:   \"58 y.o. patient was admitted to [facility] on 11/21/2018 as a transfer from [facility] for continuation of respiratory and overall rehabilitation after strokes.           Patient has h/o frontal stroke in 2011 with residual deficits; he  initially presented to [facility] on 11/1/18 after suffering chest pain. He was found to have had an acute inferoposterior ST-elevation myocardial infarction.            He was subsequently transferred to [facility] for cardiac catheterization. There, he underwent PCI x2 (TESSA to proximal circumflex and to 1st marginal relieving 100% stenosis in both) and had an intra-aortic balloon pump placed for cardiogenic shock.            While on the table, he suffered an acute stroke at which point he was transferred to the Neurointerventional suite for emergent thrombectomy of the L MCA. He was then sent to the ICU with the IABP.            He was extubated 2 days later. "            However, he then suddenly lost consciousness. It was initially thought that this was due to failure of extubation so he was re-intubated. Later, it was determined that he had new neurologic deficits. A repeat CT of the brain showed a new R MCA stroke. Thrombectomy was not indicated in this situation.            He subsequently developed pulmonary edema and underwent tracheostomy on 11/14.            His neurologic status remained poor in that he would only awaken, open his eyes but not track, could not follow commands, and was able to spontaneously move his right side but almost no movement of the left side. His course was complicated by aspiration pneumonia which was treated with antibiotics but he continued to have fevers on/off despite antibiotics and despite no other source of infection being found.          PEG was placed on 11/19/2018.         He was placed on Keppra for seizure prophylaxis, has since been D/C'd with no evidence of seizures         He was not placed on beta-blockers or ACE inhibitors post-stroke and post-MI because of hypotension. In fact, his neurological symptoms apparently worsened if he became hypotensive so Midodrine was started and neurology at [facility], has since been weaned off with stable BP.  Statin was discontinued at [facility] due to increasing LFT's, LFTs improved after discontinuation.          His LVEF was 45%.           He is on donepezil and Celexa as well, per Neurology at [facility].      Problems at the time of transfer to [facility]:        1. 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 Acute inferolateral NSTEMI s/p TESSA to L circumflex and D1 s/p cardiogenic shock with IABP now resolved        3. Acute hypoxic respiratory failure s/p tracheostomy on ventilator        4. 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  "Hypotension requiring pharmacologic support        6. Dysphagia s/p PEG on tube feeds with NPO status        7. Hyperglycemia related to tube feeds        8. Recent aspiration pneumonia s/p antibiotic treatment with persistent, intermittent fevers        9. Hyperlipidemia      10. Tobacco use disorder      11. Alcohol use disorder    Has made some progress since transfer to [facility] on 11/21/18. Has had improvement to function of his RUE, is able to communicate via handwriting. Was initially on ventilator support, but gradually improved; eventually decannulated 12/18/18.\"    Today Mr. Rollins is being evaluated for a routine review of multiple medical problems while in the TCU, as well as an intake into the TCU. Upon first visit he is alone in his room, he is nonverbal. He does have a notebook and is attempting to write-- however, it is unclear what he is trying to say. He will start to draw circles in the middle of the book. Writing is not legible. This is normal per nursing staff. Nursing staff did say that he has been doing well since having an episode of emesis two days ago (tube feeding was held for a short period and then resumed at a lower rate). No further episodes of emesis or any other concerns. He has been slightly warm but temperature at visit was 99.4. Sister Lee Ann does come in the middle of visit where an indepth discussion of goals of care and advanced care planning ensues. Did discuss that the family's goal is to get him to a point where they can take him home and care for Osvaldo. They feel he has not had enough therapy and would like him to have as much therapy as possible. Sister Lee Ann is a good advocate for her brother and shares she will be to the facility daily. She would like to see as much improvement as possible. Mr. Rollins remains nonverbal during the entire visit. Does not follow commands. He has been working with PT/OT routinely. Unable to do an ROS with patient due to condition, sister " and nursing staff do not have any new concerns or issues to report.         Past Medical History:   Diagnosis Date     Acute respiratory failure with hypoxia (H)      Aphasia      Aspiration pneumonia (H)      CAD (coronary artery disease)      Cardiogenic shock (H)      CVA (cerebral vascular accident) (H)      Dysphagia      HLD (hyperlipidemia)      Hyperglycemia      Hypotension      Intracranial hemorrhage (H)      Late effects of cerebral ischemic stroke      Left hemiparesis (H)      Stroke (H)              Family History   Problem Relation Age of Onset     Deep vein thrombosis Brother      Stroke Maternal Grandfather      Social History     Socioeconomic History     Marital status: Single     Spouse name: None     Number of children: None     Years of education: None     Highest education level: None   Social Needs     Financial resource strain: None     Food insecurity - worry: None     Food insecurity - inability: None     Transportation needs - medical: None     Transportation needs - non-medical: None   Occupational History     None   Tobacco Use     Smoking status: Never Smoker     Smokeless tobacco: Never Used   Substance and Sexual Activity     Alcohol use: Yes     Drug use: None     Sexual activity: None   Other Topics Concern     None   Social History Narrative     None     Current Outpatient Medications   Medication Sig     acetaminophen (TYLENOL) 160 mg/5 mL solution 650 mg by G-tube route every 4 (four) hours as needed for fever or pain.            aspirin 81 mg chewable tablet 81 mg by G-tube route daily.     atorvastatin (LIPITOR) 40 MG tablet 40 mg by G-tube route at bedtime.     baclofen (LIORESAL) 20 MG tablet 20 mg by G-tube route 3 (three) times a day.     bromocriptine (PARLODEL) 2.5 mg tablet 2.5 mg by G-tube route 2 (two) times a day.     chlorhexidine (PERIDEX) 0.12 % solution Apply 15 mL to the mouth or throat 2 (two) times a day.     citalopram (CELEXA) 10 MG tablet 10 mg by G-tube  route daily.     donepezil (ARICEPT) 5 MG tablet 5 mg by G-tube route daily.     emollient base (EMOLLIENT TOP) Apply 2 g topically daily.     enoxaparin (LOVENOX) 40 mg/0.4 mL syringe Inject 40 mg under the skin daily.     heparin sodium,porcine (HEPARIN, PORCINE,) 5,000 unit/mL injection Inject 5,000 Units under the skin every 8 (eight) hours.     insulin glargine (LANTUS) 100 unit/mL injection Inject 8 Units under the skin at bedtime.     ipratropium-albuterol (DUO-NEB) 0.5-2.5 mg/3 mL nebulizer Inhale 3 mL 4 (four) times a day as needed.     ticagrelor (BRILINTA) 90 mg Tab 90 mg 2 (two) times a day. Via G-tube       REVIEW OF SYSTEM:  Unable to complete due to neurologic status of patient.     PHYSICAL EXAM:   General appearance: alert, appears stated age and cooperative--however nonverbal and does not communicate  HEENT: Head is normocephalic with normal hair distribution. No evidence of trauma. Ears: Without lesions or deformity. No acute purulent discharge. Eyes: Conjunctivae pink with no scleral icterus or erythema. Nose: Normal mucosa and septum. Oropharnyx: mmm, no lesions present.  Lungs: poor inspiratory effort, clear to auscultation bilaterally, respirations without effort  Heart: regular rate and rhythm, S1, S2 normal, no murmur, click, rub or gallop  Abdomen: soft, non-tender; bowel sounds normal; no masses,  no organomegaly, g-tube in place and infusing  Extremities: extremities normal, atraumatic, no cyanosis or edema  Pulses: 2+ and symmetric  Skin: Skin color, texture, turgor normal. No rashes or lesions  Neurologic: nonverbal, left sided hemiplegia, aphasia  Psych: pleasant      LABS:   None today.     ASSESSMENT:      ICD-10-CM    1. Physical deconditioning R53.81    2. Cerebrovascular accident (CVA), unspecified mechanism (H) I63.9    3. Cardiogenic shock (H) R57.0    4. Left hemiparesis (H) G81.94    5. Intracranial hemorrhage (H) I62.9    6. Dysphagia, unspecified type R13.10    7. Aphasia  R47.01        PLAN:    Physical Deconditioning due to Cardiogenic Shock, Intracranial Hemorrhage, CVA  -Continue PT/OT and other therapies as per care plan.  -Encouraged good nutrition and movement habits.   -Discussed care plan and expected course of stay.   -Continue to follow-up per routine schedule or sooner if needed.     CVA with Hemiparesis, Dysphagia, Aphasia  -Therapies routinely.   -White board with dry erase marker for communication.   -Communication board.     Dysphagia  -SLP to work with patient--consult, eval & treat.   -Tube feeding, increase to rate of 56 cc/hr continuously.     Advanced Care Planning  -Full Code.   -POLST signed and reviewed.     Admission history and physical per MD per compliance. At this time continue current care plan for all chronic medical conditions, as they are stable. Encouraged patient to engage in PT/OT for strengthening and conditioning. Encouraged patient to work closely with nursing staff to ensure any medical complaints are quickly addressed. Follow up this week or sooner if needed. Will continue to monitor patient and work with nursing staff collaboratively to work toward positive patient outcomes    Total time of 45 minutes spent with patient and nursing staff with greater than 50% of this time spent on review of previous records, counseling, education, and discussion of the above care plan with nursing staff and patient. An additional 20 minutes, from 12:00 pm to 12:20 pm was spent discussing advanced care planning including goals of care and code status. This discussion was held with sister Lee Ann with patient in attendance.     Electronically signed by: Heather Aj CNP

## 2021-06-23 NOTE — PROGRESS NOTES
[facility]    Facility:   [facility] [[id number]]   Code Status: FULL CODE and POLST AVAILABLE      CHIEF COMPLAINT/REASON FOR VISIT:  Chief Complaint   Patient presents with     Review Of Multiple Medical Conditions     physical deconditioning, CVA, left hemiparesis, dysphagia, aphasia       HISTORY:      HPI: Osvaldo is a 58 y.o. male with complex past medical history including CVA, intracranial hemorrhage, aphasia, dysphagia, acute respiratory failure with hypoxia, CAD, cardiogenic shock, left hemiparesis (other's outlined in pmh table) presenting to [facility] for rehabilitation s/p CVA. He was hospitalized at [facility] on 11/1/2018 for a STEMI and L MCA occlusion, he was discharged on 11/21/2018 and went to [facility] where he was to undergo rehabilitative therapies. Discharging provider from [facility] summarized hospitalization in previous notes.  Has made some progress since transfer to [facility] on 11/21/18. Has had improvement to function of his RUE, is able to communicate via handwriting. Was initially on ventilator support, but gradually improved; eventually decannulated 12/18/18.  Hospital stay summarized in previous notes.    Mr. Rollins is being seen today for a review of multiple medical conditions. Osvaldo is doing well per nursing report. He is nonverbal but does follow two commands today. He does give me a high-five and Osvaldo does squeeze my hand. Nurse manager Candace states that he has occasionally been able to follow more and more commands. Osvaldo does not have any meaningful communication at this time, however it does seem he is attempting to connect. There are no new concerns from nursing. Sister Lee Ann is not at bedside during this visit.     Past Medical History:   Diagnosis Date     Acute respiratory failure with hypoxia (H)      Aphasia      Aspiration pneumonia (H)      CAD (coronary artery disease)      Cardiogenic shock (H)       CVA (cerebral vascular accident) (H)      Dysphagia      HLD (hyperlipidemia)      Hyperglycemia      Hypotension      Intracranial hemorrhage (H)      Late effects of cerebral ischemic stroke      Left hemiparesis (H)      Stroke (H)              Family History   Problem Relation Age of Onset     Deep vein thrombosis Brother      Stroke Maternal Grandfather      Social History     Socioeconomic History     Marital status: Single     Spouse name: None     Number of children: None     Years of education: None     Highest education level: None   Social Needs     Financial resource strain: None     Food insecurity - worry: None     Food insecurity - inability: None     Transportation needs - medical: None     Transportation needs - non-medical: None   Occupational History     None   Tobacco Use     Smoking status: Never Smoker     Smokeless tobacco: Never Used   Substance and Sexual Activity     Alcohol use: Yes     Drug use: None     Sexual activity: None   Other Topics Concern     None   Social History Narrative     None     Current Outpatient Medications   Medication Sig     acetaminophen (TYLENOL) 160 mg/5 mL solution 650 mg by G-tube route every 4 (four) hours as needed for fever or pain.            aspirin 81 mg chewable tablet 81 mg by G-tube route daily.     atorvastatin (LIPITOR) 40 MG tablet 40 mg by G-tube route at bedtime.     baclofen (LIORESAL) 20 MG tablet 20 mg by G-tube route 3 (three) times a day.     bromocriptine (PARLODEL) 2.5 mg tablet 2.5 mg by G-tube route 2 (two) times a day.     chlorhexidine (PERIDEX) 0.12 % solution Apply 15 mL to the mouth or throat 2 (two) times a day.     citalopram (CELEXA) 10 MG tablet 10 mg by G-tube route daily.     donepezil (ARICEPT) 5 MG tablet 5 mg by G-tube route daily.     emollient base (EMOLLIENT TOP) Apply 2 g topically daily.     enoxaparin (LOVENOX) 40 mg/0.4 mL syringe Inject 40 mg under the skin daily.     heparin sodium,porcine (HEPARIN, PORCINE,) 5,000  unit/mL injection Inject 5,000 Units under the skin every 8 (eight) hours.     insulin glargine (LANTUS) 100 unit/mL injection Inject 8 Units under the skin at bedtime.     ipratropium-albuterol (DUO-NEB) 0.5-2.5 mg/3 mL nebulizer Inhale 3 mL 4 (four) times a day as needed.     ticagrelor (BRILINTA) 90 mg Tab 90 mg 2 (two) times a day. Via G-tube       REVIEW OF SYSTEM:  Unable to complete due to neurologic status of patient.     PHYSICAL EXAM:   General appearance: alert, appears stated age and cooperative--however nonverbal and does not communicate  HEENT: Head is normocephalic with normal hair distribution. No evidence of trauma. Ears: Without lesions or deformity. No acute purulent discharge. Eyes: Conjunctivae pink with no scleral icterus or erythema. Nose: Normal mucosa and septum. Oropharnyx: mmm, no lesions present.  Lungs: poor inspiratory effort, clear to auscultation bilaterally, respirations without effort  Heart: regular rate and rhythm, S1, S2 normal, no murmur, click, rub or gallop  Abdomen: soft, non-tender; bowel sounds normal; no masses,  no organomegaly, g-tube in place and infusing  Extremities: extremities normal, atraumatic, no cyanosis or edema  Pulses: 2+ and symmetric  Skin: Skin color, texture, turgor normal. No rashes or lesions  Neurologic: nonverbal, left sided hemiplegia, aphasia, immobile  Psych: pleasant      LABS:   None today.     ASSESSMENT:      ICD-10-CM    1. Physical deconditioning R53.81    2. Left hemiparesis (H) G81.94    3. Intracranial hemorrhage (H) I62.9    4. Cerebrovascular accident (CVA), unspecified mechanism (H) I63.9    5. Dysphagia, unspecified type R13.10    6. Aphasia R47.01        PLAN:     Care plan reviewed and remains appropriate. Continue therapies.     Physical Deconditioning due to Cardiogenic Shock, Intracranial Hemorrhage, CVA- chronic, stable  -Continue PT/OT and other therapies as per care plan.  -Encouraged good nutrition and mobility as  tolerated.  -Discussed care plan and expected course of stay.   -Continue to follow-up per routine schedule or sooner if needed.     CVA with Hemiparesis, Dysphagia, Aphasia- chronic, stable  -PT/OT as directed  -White board with dry erase marker for communication.   -Communication board.     Dysphagia- chronic, stable  -SLP to work with patient--consult, eval & treat.   -Tube feeding.    Otherwise continue current care plan for all other chronic medical conditions, as they are stable. Encouraged patient to engage in healthy lifestyle behaviors such as engaging in social activities, exercising (PT/OT), eating well, and following care plan. Follow up for routine check-up, or sooner if needed. Will continue to monitor patient and work with nursing staff collaboratively to work toward positive patient outcomes.    Electronically signed by: Heather Aj CNP

## 2021-06-23 NOTE — PROGRESS NOTES
[facility]    Facility:   [facility] [[id number]]   Code Status: FULL CODE and POLST AVAILABLE      CHIEF COMPLAINT/REASON FOR VISIT:  Chief Complaint   Patient presents with     Review Of Multiple Medical Conditions     physical deconditioning, CVA, HTN, intracerebral hemorrhage       HISTORY:      HPI: Osvaldo is a 58 y.o. male with complex past medical history including CVA, intracranial hemorrhage, aphasia, dysphagia, acute respiratory failure with hypoxia, CAD, cardiogenic shock, left hemiparesis (other's outlined in pmh table) presenting to [facility] for rehabilitation s/p CVA. He was hospitalized at [facility] on 11/1/2018 for a STEMI and L MCA occlusion, he was discharged on 11/21/2018 and went to [facility] where he was to undergo rehabilitative therapies. Discharging provider from [facility] summarized hospitalization in previous notes.  Has made some progress since transfer to [facility] on 11/21/18. Has had improvement to function of his RUE, is able to communicate via handwriting. Was initially on ventilator support, but gradually improved; eventually decannulated 12/18/18.  Hospital stay summarized in previous notes.    Mr. Rollins is being seen today for a review of multiple medical conditions. Osvaldo is resting in his bed. He is again nonverbal but does have a whiteboard which is much easier for him to write on. He does write his name on the whiteboard, as well as his birthday and an address and a phone number. However he is unable to answer any questions asked of him. He does hold my hand and does squeeze it but does not do so on command. Osvaldo does not have any meaningful communication at this time, however it does seem he is attempting to connect. There are no new concerns from nursing. Sister Lee Ann is not at bedside during this visit.     Past Medical History:   Diagnosis Date     Acute respiratory failure with hypoxia (H)      Aphasia       Aspiration pneumonia (H)      CAD (coronary artery disease)      Cardiogenic shock (H)      CVA (cerebral vascular accident) (H)      Dysphagia      HLD (hyperlipidemia)      Hyperglycemia      Hypotension      Intracranial hemorrhage (H)      Late effects of cerebral ischemic stroke      Left hemiparesis (H)      Stroke (H)              Family History   Problem Relation Age of Onset     Deep vein thrombosis Brother      Stroke Maternal Grandfather      Social History     Socioeconomic History     Marital status: Single     Spouse name: None     Number of children: None     Years of education: None     Highest education level: None   Social Needs     Financial resource strain: None     Food insecurity - worry: None     Food insecurity - inability: None     Transportation needs - medical: None     Transportation needs - non-medical: None   Occupational History     None   Tobacco Use     Smoking status: Never Smoker     Smokeless tobacco: Never Used   Substance and Sexual Activity     Alcohol use: Yes     Drug use: None     Sexual activity: None   Other Topics Concern     None   Social History Narrative     None     Current Outpatient Medications   Medication Sig     acetaminophen (TYLENOL) 160 mg/5 mL solution 650 mg by G-tube route every 4 (four) hours as needed for fever or pain.            aspirin 81 mg chewable tablet 81 mg by G-tube route daily.     atorvastatin (LIPITOR) 40 MG tablet 40 mg by G-tube route at bedtime.     baclofen (LIORESAL) 20 MG tablet 20 mg by G-tube route 3 (three) times a day.     bromocriptine (PARLODEL) 2.5 mg tablet 2.5 mg by G-tube route 2 (two) times a day.     chlorhexidine (PERIDEX) 0.12 % solution Apply 15 mL to the mouth or throat 2 (two) times a day.     citalopram (CELEXA) 10 MG tablet 10 mg by G-tube route daily.     donepezil (ARICEPT) 5 MG tablet 5 mg by G-tube route daily.     emollient base (EMOLLIENT TOP) Apply 2 g topically daily.     enoxaparin (LOVENOX) 40 mg/0.4 mL  syringe Inject 40 mg under the skin daily.     heparin sodium,porcine (HEPARIN, PORCINE,) 5,000 unit/mL injection Inject 5,000 Units under the skin every 8 (eight) hours.     insulin glargine (LANTUS) 100 unit/mL injection Inject 8 Units under the skin at bedtime.     ipratropium-albuterol (DUO-NEB) 0.5-2.5 mg/3 mL nebulizer Inhale 3 mL 4 (four) times a day as needed.     ticagrelor (BRILINTA) 90 mg Tab 90 mg 2 (two) times a day. Via G-tube       REVIEW OF SYSTEM:  Unable to complete due to neurologic status of patient.     PHYSICAL EXAM:   General appearance: alert, appears stated age and cooperative--however nonverbal and does not communicate  HEENT: Head is normocephalic with normal hair distribution. No evidence of trauma. Ears: Without lesions or deformity. No acute purulent discharge. Eyes: Conjunctivae pink with no scleral icterus or erythema. Nose: Normal mucosa and septum. Oropharnyx: mmm, no lesions present.  Lungs: poor inspiratory effort, clear to auscultation bilaterally, respirations without effort  Heart: regular rate and rhythm, S1, S2 normal, no murmur, click, rub or gallop  Abdomen: soft, non-tender; bowel sounds normal; no masses,  no organomegaly, g-tube in place and infusing  Extremities: extremities normal, atraumatic, no cyanosis or edema  Pulses: 2+ and symmetric  Skin: Skin color, texture, turgor normal. No rashes or lesions  Neurologic: nonverbal, left sided hemiplegia, aphasia, immobile  Psych: pleasant      LABS:   None today.     ASSESSMENT:      ICD-10-CM    1. Physical deconditioning R53.81    2. Left hemiparesis (H) G81.94    3. Intracranial hemorrhage (H) I62.9    4. Dysphagia, unspecified type R13.10    5. Aphasia R47.01    6. Cerebrovascular accident (CVA), unspecified mechanism (H) I63.9        PLAN:       Physical Deconditioning due to Cardiogenic Shock, Intracranial Hemorrhage, CVA- chronic, stable  -Continue PT/OT and other therapies as per care plan.  -Encouraged good  nutrition and mobility as tolerated.  -Discussed care plan and expected course of stay.   -Continue to follow-up per routine schedule or sooner if needed.     CVA with Hemiparesis, Dysphagia, Aphasia- chronic, stable  -PT/OT as directed  -White board with dry erase marker for communication.   -Communication board.     Dysphagia- chronic, stable  -SLP to work with patient--consult, eval & treat.   -Tube feeding.    Otherwise continue current care plan for all other chronic medical conditions, as they are stable. Encouraged patient to engage in healthy lifestyle behaviors such as engaging in social activities, exercising (PT/OT), eating well, and following care plan. Follow up for routine check-up, or sooner if needed. Will continue to monitor patient and work with nursing staff collaboratively to work toward positive patient outcomes.    Electronically signed by: Heather Aj CNP

## 2021-06-23 NOTE — PROGRESS NOTES
[facility]    Facility:   [facility] [[id number]]   Code Status: FULL CODE and POLST AVAILABLE      CHIEF COMPLAINT/REASON FOR VISIT:  Chief Complaint   Patient presents with     Review Of Multiple Medical Conditions     physical deconditioning, CVA, aphasia, dysphagia, left hemiparesis       HISTORY:      HPI: Osvaldo is a 58 y.o. male with complex past medical history including CVA, intracranial hemorrhage, aphasia, dysphagia, acute respiratory failure with hypoxia, CAD, cardiogenic shock, left hemiparesis (other's outlined in pmh table) presenting to [facility] for rehabilitation s/p CVA. He was hospitalized at [facility] on 11/1/2018 for a STEMI and L MCA occlusion, he was discharged on 11/21/2018 and went to [facility] where he was to undergo rehabilitative therapies. Discharging provider from [facility] summarized hospitalization in previous notes.  Has made some progress since transfer to [facility] on 11/21/18. Has had improvement to function of his RUE, is able to communicate via handwriting. Was initially on ventilator support, but gradually improved; eventually decannulated 12/18/18.  Hospital stay summarized in previous notes.    Mr. Rollins is being seen today for a review of multiple medical conditions. Osvaldo is visiting with his brother today who is working Osvaldo out. They are working on his left side. Osvaldo does give me a high five with his right hand, and does squeeze my hand. Does not follow other commands, makes meaningful eye contact. Unable to share any concerns. Nursing staff feel that all is going well.     Past Medical History:   Diagnosis Date     Acute respiratory failure with hypoxia (H)      Aphasia      Aspiration pneumonia (H)      CAD (coronary artery disease)      Cardiogenic shock (H)      CVA (cerebral vascular accident) (H)      Dysphagia      HLD (hyperlipidemia)      Hyperglycemia      Hypotension      Intracranial  hemorrhage (H)      Late effects of cerebral ischemic stroke      Left hemiparesis (H)      Stroke (H)              Family History   Problem Relation Age of Onset     Deep vein thrombosis Brother      Stroke Maternal Grandfather      Social History     Socioeconomic History     Marital status: Single     Spouse name: None     Number of children: None     Years of education: None     Highest education level: None   Social Needs     Financial resource strain: None     Food insecurity - worry: None     Food insecurity - inability: None     Transportation needs - medical: None     Transportation needs - non-medical: None   Occupational History     None   Tobacco Use     Smoking status: Never Smoker     Smokeless tobacco: Never Used   Substance and Sexual Activity     Alcohol use: Yes     Drug use: None     Sexual activity: None   Other Topics Concern     None   Social History Narrative     None     Current Outpatient Medications   Medication Sig     acetaminophen (TYLENOL) 160 mg/5 mL solution 650 mg by G-tube route every 4 (four) hours as needed for fever or pain.            aspirin 81 mg chewable tablet 81 mg by G-tube route daily.     atorvastatin (LIPITOR) 40 MG tablet 40 mg by G-tube route at bedtime.     baclofen (LIORESAL) 20 MG tablet 20 mg by G-tube route 3 (three) times a day.     bromocriptine (PARLODEL) 2.5 mg tablet 2.5 mg by G-tube route 2 (two) times a day.     chlorhexidine (PERIDEX) 0.12 % solution Apply 15 mL to the mouth or throat 2 (two) times a day.     citalopram (CELEXA) 10 MG tablet 10 mg by G-tube route daily.     donepezil (ARICEPT) 5 MG tablet 5 mg by G-tube route daily.     emollient base (EMOLLIENT TOP) Apply 2 g topically daily.     enoxaparin (LOVENOX) 40 mg/0.4 mL syringe Inject 40 mg under the skin daily.     heparin sodium,porcine (HEPARIN, PORCINE,) 5,000 unit/mL injection Inject 5,000 Units under the skin every 8 (eight) hours.     insulin glargine (LANTUS) 100 unit/mL injection  Inject 8 Units under the skin at bedtime.     ipratropium-albuterol (DUO-NEB) 0.5-2.5 mg/3 mL nebulizer Inhale 3 mL 4 (four) times a day as needed.     ticagrelor (BRILINTA) 90 mg Tab 90 mg 2 (two) times a day. Via G-tube       REVIEW OF SYSTEM:  Unable to complete due to neurologic status of patient.     PHYSICAL EXAM:   General appearance: alert, appears stated age and cooperative--however nonverbal and does not communicate  HEENT: Head is normocephalic with normal hair distribution. No evidence of trauma. Ears: Without lesions or deformity. No acute purulent discharge. Eyes: Conjunctivae pink with no scleral icterus or erythema. Nose: Normal mucosa and septum. Oropharnyx: mmm, no lesions present.  Lungs: respirations without effort  Extremities: extremities normal, atraumatic, no cyanosis or edema  Pulses: 2+ and symmetric  Skin: Skin color, texture, turgor normal. No rashes or lesions  Neurologic: nonverbal, left sided hemiplegia, aphasia, immobile  Psych: pleasant      LABS:   None today.     ASSESSMENT:      ICD-10-CM    1. Physical deconditioning R53.81    2. Left hemiparesis (H) G81.94    3. Dysphagia, unspecified type R13.10    4. Aphasia R47.01    5. Cerebrovascular accident (CVA), unspecified mechanism (H) I63.9        PLAN:     Very brief visit due to family presence. Care plan remains appropriate at this time.     Physical Deconditioning due to Cardiogenic Shock, Intracranial Hemorrhage, CVA- chronic, stable  -Continue PT/OT and other therapies as per care plan.  -Encouraged good nutrition and mobility as tolerated.  -Discussed care plan and expected course of stay.   -Continue to follow-up per routine schedule or sooner if needed.     CVA with Hemiparesis, Dysphagia, Aphasia- chronic, stable  -PT/OT as directed  -White board with dry erase marker for communication.   -Communication board.     Dysphagia- chronic, stable  -SLP to work with patient--consult, eval & treat.   -Tube feeding.    Otherwise  continue current care plan for all other chronic medical conditions, as they are stable. Encouraged patient to engage in healthy lifestyle behaviors such as engaging in social activities, exercising (PT/OT), eating well, and following care plan. Follow up for routine check-up, or sooner if needed. Will continue to monitor patient and work with nursing staff collaboratively to work toward positive patient outcomes.    Electronically signed by: Heather Aj CNP

## 2021-06-24 NOTE — PROGRESS NOTES
[facility]    Facility:   [facility] [[id number]]   Code Status: FULL CODE and POLST AVAILABLE      CHIEF COMPLAINT/REASON FOR VISIT:  Chief Complaint   Patient presents with     Review Of Multiple Medical Conditions     physical deconditioning, CVA, aphasia, dysphagia, left hemiparesis       HISTORY:      HPI: Osvaldo is a 58 y.o. male with complex past medical history including CVA, intracranial hemorrhage, aphasia, dysphagia, acute respiratory failure with hypoxia, CAD, cardiogenic shock, left hemiparesis (other's outlined in pmh table) presenting to [facility] for rehabilitation s/p CVA. He was hospitalized at [facility] on 11/1/2018 for a STEMI and L MCA occlusion, he was discharged on 11/21/2018 and went to [facility] where he was to undergo rehabilitative therapies. Discharging provider from [facility] summarized hospitalization in previous notes.  Has made some progress since transfer to [facility] on 11/21/18. Has had improvement to function of his RUE, is able to communicate via handwriting. Was initially on ventilator support, but gradually improved; eventually decannulated 12/18/18.  Hospital stay summarized in previous notes.    Mr. Rollins is being seen today for a review of multiple medical conditions. Osvaldo is resting in his wheelchair in the day room. He is is unable to express himself but will follow simple commands. Does not attempt to write anything today. He will squeeze my hand, give a high five, tap right foot. Still unable to use left side. He is now wearing a brace on his left arm. Family is not at his side at this time. Unable to complete ROS due to neurological status but no concerns from nursing staff at this time.    Past Medical History:   Diagnosis Date     Acute respiratory failure with hypoxia (H)      Aphasia      Aspiration pneumonia (H)      CAD (coronary artery disease)      Cardiogenic shock (H)      CVA (cerebral vascular  accident) (H)      Dysphagia      HLD (hyperlipidemia)      Hyperglycemia      Hypotension      Intracranial hemorrhage (H)      Late effects of cerebral ischemic stroke      Left hemiparesis (H)      Stroke (H)              Family History   Problem Relation Age of Onset     Deep vein thrombosis Brother      Stroke Maternal Grandfather      Social History     Socioeconomic History     Marital status: Single     Spouse name: None     Number of children: None     Years of education: None     Highest education level: None   Occupational History     None   Social Needs     Financial resource strain: None     Food insecurity:     Worry: None     Inability: None     Transportation needs:     Medical: None     Non-medical: None   Tobacco Use     Smoking status: Never Smoker     Smokeless tobacco: Never Used   Substance and Sexual Activity     Alcohol use: Yes     Drug use: None     Sexual activity: None   Lifestyle     Physical activity:     Days per week: None     Minutes per session: None     Stress: None   Relationships     Social connections:     Talks on phone: None     Gets together: None     Attends Mu-ism service: None     Active member of club or organization: None     Attends meetings of clubs or organizations: None     Relationship status: None     Intimate partner violence:     Fear of current or ex partner: None     Emotionally abused: None     Physically abused: None     Forced sexual activity: None   Other Topics Concern     None   Social History Narrative     None     Current Outpatient Medications   Medication Sig     acetaminophen (TYLENOL) 160 mg/5 mL solution 650 mg by G-tube route every 4 (four) hours as needed for fever or pain.            aspirin 81 mg chewable tablet 81 mg by G-tube route daily.     atorvastatin (LIPITOR) 40 MG tablet 40 mg by G-tube route at bedtime.     baclofen (LIORESAL) 20 MG tablet 20 mg by G-tube route 3 (three) times a day.     bromocriptine (PARLODEL) 2.5 mg tablet 2.5  mg by G-tube route 2 (two) times a day.     chlorhexidine (PERIDEX) 0.12 % solution Apply 15 mL to the mouth or throat 2 (two) times a day.     citalopram (CELEXA) 10 MG tablet 10 mg by G-tube route daily.     donepezil (ARICEPT) 5 MG tablet 5 mg by G-tube route daily.     emollient base (EMOLLIENT TOP) Apply 2 g topically daily.     enoxaparin (LOVENOX) 40 mg/0.4 mL syringe Inject 40 mg under the skin daily.     heparin sodium,porcine (HEPARIN, PORCINE,) 5,000 unit/mL injection Inject 5,000 Units under the skin every 8 (eight) hours.     insulin glargine (LANTUS) 100 unit/mL injection Inject 8 Units under the skin at bedtime.     ipratropium-albuterol (DUO-NEB) 0.5-2.5 mg/3 mL nebulizer Inhale 3 mL 4 (four) times a day as needed.     ticagrelor (BRILINTA) 90 mg Tab 90 mg 2 (two) times a day. Via G-tube       REVIEW OF SYSTEM:  Unable to complete due to neurologic status of patient.     PHYSICAL EXAM:   General appearance: alert, appears stated age and cooperative  HEENT: Head is normocephalic with normal hair distribution. No evidence of trauma. Ears: Without lesions or deformity. No acute purulent discharge. Eyes: Conjunctivae pink with no scleral icterus or erythema. Nose: Normal mucosa and septum. Oropharnyx: mmm, no lesions present.  Lungs: clear to auscultation bilaterally, respirations without effort  Heart: regular rate and rhythm, S1, S2 normal, no murmur, click, rub or gallop  Abdomen: soft, non-tender; bowel sounds normal; no masses,  no organomegaly, g-tube in place and infusing  Extremities: extremities normal, atraumatic, no cyanosis or edema  Pulses: 2+ and symmetric  Skin: Skin color, texture, turgor normal. No rashes or lesions  Neurologic: nonverbal, left sided hemiplegia, aphasia, immobile, will attempt to follow commands and is able to use right hand and foot.   Psych: pleasant      LABS:   None today.     ASSESSMENT:      ICD-10-CM    1. Physical deconditioning R53.81    2. Left hemiparesis (H)  G81.94    3. Intracranial hemorrhage (H) I62.9    4. Aphasia R47.01    5. Dysphagia, unspecified type R13.10    6. Cerebrovascular accident (CVA), unspecified mechanism (H) I63.9        PLAN:     No changes to care plan warranted. Care plan reviewed and remains appropriate. Patient is slowly progressing.    Physical Deconditioning due to Cardiogenic Shock, Intracranial Hemorrhage, CVA- chronic, stable  -Continue PT/OT and other therapies as per care plan.  -Encouraged good nutrition and mobility as tolerated.  -Discussed care plan and expected course of stay.   -Continue to follow-up per routine schedule or sooner if needed.     CVA with Hemiparesis, Dysphagia, Aphasia- chronic, stable  -PT/OT as directed  -White board with dry erase marker for communication.   -Communication board.     Dysphagia- chronic, stable  -SLP to work with patient--consult, eval & treat.   -Tube feeding.    Otherwise continue current care plan for all other chronic medical conditions, as they are stable. Encouraged patient to engage in healthy lifestyle behaviors such as engaging in social activities, exercising (PT/OT), eating well, and following care plan. Follow up for routine check-up, or sooner if needed. Will continue to monitor patient and work with nursing staff collaboratively to work toward positive patient outcomes.    Electronically signed by: Heather Aj CNP

## 2021-06-24 NOTE — PROGRESS NOTES
"[facility]    Facility:   [facility] [[id number]]   Code Status: FULL CODE and POLST AVAILABLE      CHIEF COMPLAINT/REASON FOR VISIT:  Chief Complaint   Patient presents with     Review Of Multiple Medical Conditions       HISTORY:      HPI: Osvaldo is a 58 y.o. male seen today for a review of medical conditions. Osvaldo has a complex  past medical history including CVA, intracranial hemorrhage, aphasia, dysphagia, acute respiratory failure with hypoxia, CAD, cardiogenic shock, left hemiparesis . He was hospitalized at [facility] on 11/1/2018 for a STEMI and L MCA occlusion, he was discharged on 11/21/2018 and went to [facility] where he was to undergo rehabilitative therapies.He is able to use his communication board a nd when asked what was up today he wrote ,\"nothin new\". Per nursing report incontinent of B/B and is totally dependant on staff for all cares.      Past Medical History:   Diagnosis Date     Acute respiratory failure with hypoxia (H)      Aphasia      Aspiration pneumonia (H)      CAD (coronary artery disease)      Cardiogenic shock (H)      CVA (cerebral vascular accident) (H)      Dysphagia      HLD (hyperlipidemia)      Hyperglycemia      Hypotension      Intracranial hemorrhage (H)      Late effects of cerebral ischemic stroke      Left hemiparesis (H)      Stroke (H)              Family History   Problem Relation Age of Onset     Deep vein thrombosis Brother      Stroke Maternal Grandfather      Social History     Socioeconomic History     Marital status: Single     Spouse name: Not on file     Number of children: Not on file     Years of education: Not on file     Highest education level: Not on file   Occupational History     Not on file   Social Needs     Financial resource strain: Not on file     Food insecurity:     Worry: Not on file     Inability: Not on file     Transportation needs:     Medical: Not on file     Non-medical: Not on " file   Tobacco Use     Smoking status: Never Smoker     Smokeless tobacco: Never Used   Substance and Sexual Activity     Alcohol use: Yes     Drug use: Not on file     Sexual activity: Not on file   Lifestyle     Physical activity:     Days per week: Not on file     Minutes per session: Not on file     Stress: Not on file   Relationships     Social connections:     Talks on phone: Not on file     Gets together: Not on file     Attends Gnosticist service: Not on file     Active member of club or organization: Not on file     Attends meetings of clubs or organizations: Not on file     Relationship status: Not on file     Intimate partner violence:     Fear of current or ex partner: Not on file     Emotionally abused: Not on file     Physically abused: Not on file     Forced sexual activity: Not on file   Other Topics Concern     Not on file   Social History Narrative     Not on file     Current Outpatient Medications   Medication Sig     acetaminophen (TYLENOL) 160 mg/5 mL solution 650 mg by G-tube route every 4 (four) hours as needed for fever or pain.            aspirin 81 mg chewable tablet 81 mg by G-tube route daily.     atorvastatin (LIPITOR) 40 MG tablet 40 mg by G-tube route at bedtime.     baclofen (LIORESAL) 20 MG tablet 20 mg by G-tube route 3 (three) times a day.     bromocriptine (PARLODEL) 2.5 mg tablet 2.5 mg by G-tube route 2 (two) times a day.     chlorhexidine (PERIDEX) 0.12 % solution Apply 15 mL to the mouth or throat 2 (two) times a day.     citalopram (CELEXA) 10 MG tablet 10 mg by G-tube route daily.     donepezil (ARICEPT) 5 MG tablet 5 mg by G-tube route daily.     emollient base (EMOLLIENT TOP) Apply 2 g topically daily.     enoxaparin (LOVENOX) 40 mg/0.4 mL syringe Inject 40 mg under the skin daily.     heparin sodium,porcine (HEPARIN, PORCINE,) 5,000 unit/mL injection Inject 5,000 Units under the skin every 8 (eight) hours.     insulin glargine (LANTUS) 100 unit/mL injection Inject 8 Units  under the skin at bedtime.     ipratropium-albuterol (DUO-NEB) 0.5-2.5 mg/3 mL nebulizer Inhale 3 mL 4 (four) times a day as needed.     ticagrelor (BRILINTA) 90 mg Tab 90 mg 2 (two) times a day. Via G-tube       REVIEW OF SYSTEM:  Per HPI.     PHYSICAL EXAM:   General appearance: alert, young appearing male , non verbal  HEENT: Head is normocephalic with normal hair distribution. No evidence of trauma. Ears: Without lesions or deformity. No acute purulent discharge. Eyes: Conjunctivae pink with no scleral icterus or erythema. Nose: Normal mucosa and septum. Oropharnyx: mmm, no lesions present.  Lungs: clear to auscultation bilaterally, respirations without effort  Heart: regular rate and rhythm, S1, S2 normal  Abdomen: soft, non-tender; bowel sounds normal; no masses,  no organomegaly, g-tube in place and infusing  Extremities: extremities normal, atraumatic, no cyanosis or edema left mesfin  Pulses: 2+ and symmetric  Skin: Skin color, texture, turgor normal. No rashes or lesions  Neurologic: nonverbal, left sided hemiplegia, aphasia, immobile, will attempt to follow commands and is able to use right hand and foot, able to communicate well today with white board, wrote info down when asked        LABS:   None today.     ASSESSMENT:      ICD-10-CM    1. Cerebrovascular accident (CVA), unspecified mechanism (H) I63.9    2. Left hemiparesis (H) G81.94    3. Dysphagia, unspecified type R13.10    4. Aphasia R47.01        PLAN:     CVA with Hemiparesis, Dysphagia, Aphasia- chronic, stable  -PT/OT as directed  -White board with dry erase marker for communication.   -Communication board.  - Staff providing all ADL, grooming, left mesfin and flacid    Dysphagia- chronic, stable  -SLP to work with patient--consult, eval & treat.   -Tube feeding, daily care to peg site  - Close monitoring of wts and dietary following    Electronically signed by: Josefina Tariq CNP

## 2021-06-24 NOTE — PROGRESS NOTES
[facility]    Facility:   [facility] [[id number]]   Code Status: FULL CODE and POLST AVAILABLE      CHIEF COMPLAINT/REASON FOR VISIT:  Chief Complaint   Patient presents with     Review Of Multiple Medical Conditions     physical deconditioning, CVA, hemiparesis, aphasia, dysphagia       HISTORY:      HPI: Osvaldo is a 58 y.o. male with complex past medical history including CVA, intracranial hemorrhage, aphasia, dysphagia, acute respiratory failure with hypoxia, CAD, cardiogenic shock, left hemiparesis (other's outlined in pmh table) presenting to [facility] for rehabilitation s/p CVA. He was hospitalized at [facility] on 11/1/2018 for a STEMI and L MCA occlusion, he was discharged on 11/21/2018 and went to [facility] where he was to undergo rehabilitative therapies. Discharging provider from [facility] summarized hospitalization in previous notes.  Has made some progress since transfer to [facility] on 11/21/18. Has had improvement to function of his RUE, is able to communicate via handwriting. Was initially on ventilator support, but gradually improved; eventually decannulated 12/18/18.  Hospital stay summarized in previous notes.    Today Osvaldo is being evaluated for a routine review of multiple medical problems. He is doing quite well today and is actually holding a conversation with writer via a whiteboard. He is able to answer simple questions and is able to share what he is feeling. Osvaldo is following commands well. He states he is feeling good and that things are going well. He has been working hard at therapy. He does not have any family with him at this time. Osvaldo denies any other concerns including fevers/chills, cough or cold symptoms, headaches, vision changes, chest pain/pressure, difficulty breathing, SOB, abdominal pain, nausea, vomiting, diarrhea, dysuria, increasing weakness, increasing pain.       Past Medical History:   Diagnosis Date      Acute respiratory failure with hypoxia (H)      Aphasia      Aspiration pneumonia (H)      CAD (coronary artery disease)      Cardiogenic shock (H)      CVA (cerebral vascular accident) (H)      Dysphagia      HLD (hyperlipidemia)      Hyperglycemia      Hypotension      Intracranial hemorrhage (H)      Late effects of cerebral ischemic stroke      Left hemiparesis (H)      Stroke (H)              Family History   Problem Relation Age of Onset     Deep vein thrombosis Brother      Stroke Maternal Grandfather      Social History     Socioeconomic History     Marital status: Single     Spouse name: None     Number of children: None     Years of education: None     Highest education level: None   Occupational History     None   Social Needs     Financial resource strain: None     Food insecurity:     Worry: None     Inability: None     Transportation needs:     Medical: None     Non-medical: None   Tobacco Use     Smoking status: Never Smoker     Smokeless tobacco: Never Used   Substance and Sexual Activity     Alcohol use: Yes     Drug use: None     Sexual activity: None   Lifestyle     Physical activity:     Days per week: None     Minutes per session: None     Stress: None   Relationships     Social connections:     Talks on phone: None     Gets together: None     Attends Scientology service: None     Active member of club or organization: None     Attends meetings of clubs or organizations: None     Relationship status: None     Intimate partner violence:     Fear of current or ex partner: None     Emotionally abused: None     Physically abused: None     Forced sexual activity: None   Other Topics Concern     None   Social History Narrative     None     Current Outpatient Medications   Medication Sig     acetaminophen (TYLENOL) 160 mg/5 mL solution 650 mg by G-tube route every 4 (four) hours as needed for fever or pain.            aspirin 81 mg chewable tablet 81 mg by G-tube route daily.     atorvastatin  (LIPITOR) 40 MG tablet 40 mg by G-tube route at bedtime.     baclofen (LIORESAL) 20 MG tablet 20 mg by G-tube route 3 (three) times a day.     bromocriptine (PARLODEL) 2.5 mg tablet 2.5 mg by G-tube route 2 (two) times a day.     chlorhexidine (PERIDEX) 0.12 % solution Apply 15 mL to the mouth or throat 2 (two) times a day.     citalopram (CELEXA) 10 MG tablet 10 mg by G-tube route daily.     donepezil (ARICEPT) 5 MG tablet 5 mg by G-tube route daily.     emollient base (EMOLLIENT TOP) Apply 2 g topically daily.     enoxaparin (LOVENOX) 40 mg/0.4 mL syringe Inject 40 mg under the skin daily.     heparin sodium,porcine (HEPARIN, PORCINE,) 5,000 unit/mL injection Inject 5,000 Units under the skin every 8 (eight) hours.     insulin glargine (LANTUS) 100 unit/mL injection Inject 8 Units under the skin at bedtime.     ipratropium-albuterol (DUO-NEB) 0.5-2.5 mg/3 mL nebulizer Inhale 3 mL 4 (four) times a day as needed.     ticagrelor (BRILINTA) 90 mg Tab 90 mg 2 (two) times a day. Via G-tube       REVIEW OF SYSTEM:  Per HPI.     PHYSICAL EXAM:   General appearance: alert, appears stated age and cooperative  HEENT: Head is normocephalic with normal hair distribution. No evidence of trauma. Ears: Without lesions or deformity. No acute purulent discharge. Eyes: Conjunctivae pink with no scleral icterus or erythema. Nose: Normal mucosa and septum. Oropharnyx: mmm, no lesions present.  Lungs: clear to auscultation bilaterally, respirations without effort  Heart: regular rate and rhythm, S1, S2 normal, no murmur, click, rub or gallop  Abdomen: soft, non-tender; bowel sounds normal; no masses,  no organomegaly, g-tube in place and infusing  Extremities: extremities normal, atraumatic, no cyanosis or edema  Pulses: 2+ and symmetric  Skin: Skin color, texture, turgor normal. No rashes or lesions  Neurologic: nonverbal, left sided hemiplegia, aphasia, immobile, will attempt to follow commands and is able to use right hand and foot,  able to communicate well today with white board.  Psych: pleasant      LABS:   None today.     ASSESSMENT:      ICD-10-CM    1. Physical deconditioning R53.81    2. Left hemiparesis (H) G81.94    3. Intracranial hemorrhage (H) I62.9    4. Dysphagia, unspecified type R13.10        PLAN:     No changes to care plan warranted. Care plan reviewed and remains appropriate. At this time plan is for patient to use all 100 days of therapy. Care conference planned for early next week to initiate further planning for discharge.    Physical Deconditioning due to Cardiogenic Shock, Intracranial Hemorrhage, CVA- chronic, stable  -Continue PT/OT and other therapies as per care plan.  -Encouraged good nutrition and mobility as tolerated.  -Discussed care plan and expected course of stay.   -Continue to follow-up per routine schedule or sooner if needed.     CVA with Hemiparesis, Dysphagia, Aphasia- chronic, stable  -PT/OT as directed  -White board with dry erase marker for communication.   -Communication board.     Dysphagia- chronic, stable  -SLP to work with patient--consult, eval & treat.   -Tube feeding.    Otherwise continue current care plan for all other chronic medical conditions, as they are stable. Encouraged patient to engage in healthy lifestyle behaviors such as engaging in social activities, exercising (PT/OT), eating well, and following care plan. Follow up for routine check-up, or sooner if needed. Will continue to monitor patient and work with nursing staff collaboratively to work toward positive patient outcomes.    Electronically signed by: Heather Aj CNP

## 2021-06-24 NOTE — PROGRESS NOTES
[facility]    Facility:   [facility] [[id number]]   Code Status: FULL CODE and POLST AVAILABLE      CHIEF COMPLAINT/REASON FOR VISIT:  Chief Complaint   Patient presents with     Review Of Multiple Medical Conditions     physical deconditioning, s/p CVA, ICH, dysphagia, aphasia       HISTORY:      HPI: Osvaldo is a 58 y.o. male with complex past medical history including CVA, intracranial hemorrhage, aphasia, dysphagia, acute respiratory failure with hypoxia, CAD, cardiogenic shock, left hemiparesis (other's outlined in pmh table) presenting to [facility] for rehabilitation s/p CVA. He was hospitalized at [facility] on 11/1/2018 for a STEMI and L MCA occlusion, he was discharged on 11/21/2018 and went to [facility] where he was to undergo rehabilitative therapies. Discharging provider from [facility] summarized hospitalization in previous notes.  Has made some progress since transfer to [facility] on 11/21/18. Has had improvement to function of his RUE, is able to communicate via handwriting. Was initially on ventilator support, but gradually improved; eventually decannulated 12/18/18.  Hospital stay summarized in previous notes.    Mr. Rollins is being seen today for a review of multiple medical conditions. Sister Lee Ann is here at bedside and states she is upset that Osvaldo has not been getting up on regular intervals and times. She did discuss with nurse manager and asked myself to do the same. She states that otherwise he has been doing well. They continue to work with Osvaldo for multiple hours each day. She feels he is getting stronger and more active. Today Osvaldo was able to respond to more commands that he has been able to in previous. He squeezed my hand with his right hand, gave me a high five, was able to push his right foot down and pull it up. He is still unable to use his left side or his mouth. He does not have control of his mouth and has  uncontrollable drool from his mouth. Family is trying to work with him on mouth control. Unable to complete ROS due to neurological status but no concerns from family or nursing staff at this time.    Past Medical History:   Diagnosis Date     Acute respiratory failure with hypoxia (H)      Aphasia      Aspiration pneumonia (H)      CAD (coronary artery disease)      Cardiogenic shock (H)      CVA (cerebral vascular accident) (H)      Dysphagia      HLD (hyperlipidemia)      Hyperglycemia      Hypotension      Intracranial hemorrhage (H)      Late effects of cerebral ischemic stroke      Left hemiparesis (H)      Stroke (H)              Family History   Problem Relation Age of Onset     Deep vein thrombosis Brother      Stroke Maternal Grandfather      Social History     Socioeconomic History     Marital status: Single     Spouse name: None     Number of children: None     Years of education: None     Highest education level: None   Social Needs     Financial resource strain: None     Food insecurity - worry: None     Food insecurity - inability: None     Transportation needs - medical: None     Transportation needs - non-medical: None   Occupational History     None   Tobacco Use     Smoking status: Never Smoker     Smokeless tobacco: Never Used   Substance and Sexual Activity     Alcohol use: Yes     Drug use: None     Sexual activity: None   Other Topics Concern     None   Social History Narrative     None     Current Outpatient Medications   Medication Sig     acetaminophen (TYLENOL) 160 mg/5 mL solution 650 mg by G-tube route every 4 (four) hours as needed for fever or pain.            aspirin 81 mg chewable tablet 81 mg by G-tube route daily.     atorvastatin (LIPITOR) 40 MG tablet 40 mg by G-tube route at bedtime.     baclofen (LIORESAL) 20 MG tablet 20 mg by G-tube route 3 (three) times a day.     bromocriptine (PARLODEL) 2.5 mg tablet 2.5 mg by G-tube route 2 (two) times a day.     chlorhexidine (PERIDEX)  0.12 % solution Apply 15 mL to the mouth or throat 2 (two) times a day.     citalopram (CELEXA) 10 MG tablet 10 mg by G-tube route daily.     donepezil (ARICEPT) 5 MG tablet 5 mg by G-tube route daily.     emollient base (EMOLLIENT TOP) Apply 2 g topically daily.     enoxaparin (LOVENOX) 40 mg/0.4 mL syringe Inject 40 mg under the skin daily.     heparin sodium,porcine (HEPARIN, PORCINE,) 5,000 unit/mL injection Inject 5,000 Units under the skin every 8 (eight) hours.     insulin glargine (LANTUS) 100 unit/mL injection Inject 8 Units under the skin at bedtime.     ipratropium-albuterol (DUO-NEB) 0.5-2.5 mg/3 mL nebulizer Inhale 3 mL 4 (four) times a day as needed.     ticagrelor (BRILINTA) 90 mg Tab 90 mg 2 (two) times a day. Via G-tube       REVIEW OF SYSTEM:  Unable to complete due to neurologic status of patient.     PHYSICAL EXAM:   General appearance: alert, appears stated age and cooperative--however nonverbal and does not communicate  HEENT: Head is normocephalic with normal hair distribution. No evidence of trauma. Ears: Without lesions or deformity. No acute purulent discharge. Eyes: Conjunctivae pink with no scleral icterus or erythema. Nose: Normal mucosa and septum. Oropharnyx: mmm, no lesions present.  Lungs: poor inspiratory effort, clear to auscultation bilaterally, respirations without effort  Heart: regular rate and rhythm, S1, S2 normal, no murmur, click, rub or gallop  Abdomen: soft, non-tender; bowel sounds normal; no masses,  no organomegaly, g-tube in place and infusing  Extremities: extremities normal, atraumatic, no cyanosis or edema  Pulses: 2+ and symmetric  Skin: Skin color, texture, turgor normal. No rashes or lesions  Neurologic: nonverbal, left sided hemiplegia, aphasia, immobile  Psych: pleasant      LABS:   None today.     ASSESSMENT:      ICD-10-CM    1. Physical deconditioning R53.81    2. Left hemiparesis (H) G81.94    3. Intracranial hemorrhage (H) I62.9    4. Dysphagia,  unspecified type R13.10    5. Aphasia R47.01    6. Cerebrovascular accident (CVA), unspecified mechanism (H) I63.9        PLAN:     Care plan reviewed and remains appropriate. Continue therapies. Discussed concerns with Candace ETIENNE, Nurse Manager.     Physical Deconditioning due to Cardiogenic Shock, Intracranial Hemorrhage, CVA- chronic, stable  -Continue PT/OT and other therapies as per care plan.  -Encouraged good nutrition and mobility as tolerated.  -Discussed care plan and expected course of stay.   -Continue to follow-up per routine schedule or sooner if needed.     CVA with Hemiparesis, Dysphagia, Aphasia- chronic, stable  -PT/OT as directed  -White board with dry erase marker for communication.   -Communication board.     Dysphagia- chronic, stable  -SLP to work with patient--consult, eval & treat.   -Tube feeding.    Otherwise continue current care plan for all other chronic medical conditions, as they are stable. Encouraged patient to engage in healthy lifestyle behaviors such as engaging in social activities, exercising (PT/OT), eating well, and following care plan. Follow up for routine check-up, or sooner if needed. Will continue to monitor patient and work with nursing staff collaboratively to work toward positive patient outcomes.    Electronically signed by: Heather Aj CNP

## 2021-06-25 NOTE — PROGRESS NOTES
[facility]    Facility:   [facility] [[id number]]   Code Status: FULL CODE and POLST AVAILABLE      CHIEF COMPLAINT/REASON FOR VISIT:  Chief Complaint   Patient presents with     Review Of Multiple Medical Conditions     CVA, cardiogenic shock, physical deconditioning, left hemiparesis, intracranial hemorrhage, dysphagia, and aphasia       HISTORY:      HPI: Osvaldo is a 58 y.o. male with complex past medical history including CVA, intracranial hemorrhage, aphasia, dysphagia, acute respiratory failure with hypoxia, CAD, cardiogenic shock, left hemiparesis (other's outlined in pmh table) [facility] for rehabilitation s/p CVA. He was hospitalized at [facility] on 11/1/2018 for a STEMI and L MCA occlusion, he was discharged on 11/21/2018 and went to [facility] where he was to undergo rehabilitative therapies. Discharging provider from [facility] summarized hospitalization in previous notes.  Has made some progress since transfer to [facility] on 11/21/18. Has had improvement to function of his RUE, is able to communicate via handwriting. Was initially on ventilator support, but gradually improved; eventually decannulated 12/18/18.  Hospital stay summarized in previous notes.    Today Osvaldo is being evaluated for a routine review of multiple medical problems. Osvaldo has been more and more interactive per nursing report and during today's visit Osvaldo was alert but not able to use communication board.  Nursing staff denied any specific concerns for that patient at this time.    Past Medical History:   Diagnosis Date     Acute respiratory failure with hypoxia (H)      Aphasia      Aspiration pneumonia (H)      CAD (coronary artery disease)      Cardiogenic shock (H)      CVA (cerebral vascular accident) (H)      Dysphagia      HLD (hyperlipidemia)      Hyperglycemia      Hypotension      Intracranial hemorrhage (H)      Late effects of cerebral  ischemic stroke      Left hemiparesis (H)      Stroke (H)              Family History   Problem Relation Age of Onset     Deep vein thrombosis Brother      Stroke Maternal Grandfather      Social History     Socioeconomic History     Marital status: Single     Spouse name: None     Number of children: None     Years of education: None     Highest education level: None   Occupational History     None   Social Needs     Financial resource strain: None     Food insecurity:     Worry: None     Inability: None     Transportation needs:     Medical: None     Non-medical: None   Tobacco Use     Smoking status: Never Smoker     Smokeless tobacco: Never Used   Substance and Sexual Activity     Alcohol use: Yes     Drug use: None     Sexual activity: None   Lifestyle     Physical activity:     Days per week: None     Minutes per session: None     Stress: None   Relationships     Social connections:     Talks on phone: None     Gets together: None     Attends Confucianism service: None     Active member of club or organization: None     Attends meetings of clubs or organizations: None     Relationship status: None     Intimate partner violence:     Fear of current or ex partner: None     Emotionally abused: None     Physically abused: None     Forced sexual activity: None   Other Topics Concern     None   Social History Narrative     None     Current Outpatient Medications   Medication Sig     acetaminophen (TYLENOL) 160 mg/5 mL solution 650 mg by G-tube route every 4 (four) hours as needed for fever or pain.            aspirin 81 mg chewable tablet 81 mg by G-tube route daily.     atorvastatin (LIPITOR) 40 MG tablet 40 mg by G-tube route at bedtime.     baclofen (LIORESAL) 20 MG tablet 20 mg by G-tube route 3 (three) times a day.     bromocriptine (PARLODEL) 2.5 mg tablet 2.5 mg by G-tube route 2 (two) times a day.     chlorhexidine (PERIDEX) 0.12 % solution Apply 15 mL to the mouth or throat 2 (two) times a day.     citalopram  (CELEXA) 10 MG tablet 10 mg by G-tube route daily.     donepezil (ARICEPT) 5 MG tablet 5 mg by G-tube route daily.     emollient base (EMOLLIENT TOP) Apply 2 g topically daily.     enoxaparin (LOVENOX) 40 mg/0.4 mL syringe Inject 40 mg under the skin daily.     heparin sodium,porcine (HEPARIN, PORCINE,) 5,000 unit/mL injection Inject 5,000 Units under the skin every 8 (eight) hours.     insulin glargine (LANTUS) 100 unit/mL injection Inject 8 Units under the skin at bedtime.     ipratropium-albuterol (DUO-NEB) 0.5-2.5 mg/3 mL nebulizer Inhale 3 mL 4 (four) times a day as needed.     ticagrelor (BRILINTA) 90 mg Tab 90 mg 2 (two) times a day. Via G-tube       REVIEW OF SYSTEM:  Per HPI.     PHYSICAL EXAM:   General appearance: alert, appears stated age and cooperative  HEENT: Head is normocephalic with normal hair distribution. No evidence of trauma. Ears: Without lesions or deformity. No acute purulent discharge. Eyes: Conjunctivae pink with no scleral icterus or erythema. Nose: Normal mucosa and septum. Oropharnyx: mmm, no lesions present.  Lungs: clear to auscultation bilaterally, respirations without effort  Heart: regular rate and rhythm, S1, S2 normal, no murmur, click, rub or gallop  Abdomen: soft, non-tender; bowel sounds normal; no masses,  no organomegaly, g-tube in place and infusing  Extremities: extremities normal, atraumatic, no cyanosis or edema  Pulses: 2+ and symmetric  Skin: Skin color, texture, turgor normal. No rashes or lesions  Neurologic: nonverbal, left sided hemiplegia, aphasia, immobile, will attempt to follow commands and is able to use right hand and foot, able to communicate well with white board per nursing staff.  Psych: pleasant      LABS:   None today.     ASSESSMENT:      ICD-10-CM    1. Cerebrovascular accident (CVA), unspecified mechanism (H) I63.9    2. Cardiogenic shock (H) R57.0    3. Physical deconditioning R53.81    4. Left hemiparesis (H) G81.94    5. Intracranial hemorrhage  (H) I62.9    6. Dysphagia, unspecified type R13.10    7. Aphasia R47.01        PLAN:     No changes to care plan warranted. Care plan reviewed and remains appropriate. At this time plan is for patient to use all 100 days of therapy. Care conference planned for early next week to initiate further planning for discharge. Continue to improve and conversation is markedly improved since last visit.     Physical Deconditioning due to Cardiogenic Shock, Intracranial Hemorrhage, CVA- chronic, stable  -Continue PT/OT and other therapies as per care plan.  -Encouraged good nutrition and mobility as tolerated.  -Discussed care plan and expected course of stay.   -Continue to follow-up per routine schedule or sooner if needed.     CVA with Hemiparesis, Dysphagia, Aphasia- chronic, stable  -PT/OT as directed  -White board with dry erase marker for communication.   -Communication board.     Dysphagia- chronic, stable  -SLP to work with patient--consult, eval & treat.   -Tube feeding.    Otherwise continue current care plan for all other chronic medical conditions, as they are stable. Encouraged patient to engage in healthy lifestyle behaviors such as engaging in social activities, exercising (PT/OT), eating well, and following care plan. Follow up for routine check-up, or sooner if needed. Will continue to monitor patient and work with nursing staff collaboratively to work toward positive patient outcomes.    Electronically signed by: Heather Aj CNP    Patient evaluated by Heather Aj CNP in conjunction with Karen Galvin CNP, who created note. Heather Aj CNP, then edited note. Plan agreed upon by patient and nurse practitioner.

## 2021-06-25 NOTE — PROGRESS NOTES
[facility]    Facility:   [facility] [[id number]]   Code Status: FULL CODE and POLST AVAILABLE      CHIEF COMPLAINT/REASON FOR VISIT:  Chief Complaint   Patient presents with     Review Of Multiple Medical Conditions     physical deconditioning, CVA, aphasia, dysphagia, HTN       HISTORY:      HPI: Osvaldo is a 58 y.o. male with complex past medical history including CVA, intracranial hemorrhage, aphasia, dysphagia, acute respiratory failure with hypoxia, CAD, cardiogenic shock, left hemiparesis (other's outlined in pmh table) presenting to [facility] for rehabilitation s/p CVA. He was hospitalized at [facility] on 11/1/2018 for a STEMI and L MCA occlusion, he was discharged on 11/21/2018 and went to [facility] where he was to undergo rehabilitative therapies. Discharging provider from [facility] summarized hospitalization in previous notes.  Has made some progress since transfer to [facility] on 11/21/18. Has had improvement to function of his RUE, is able to communicate via handwriting. Was initially on ventilator support, but gradually improved; eventually decannulated 12/18/18.  Hospital stay summarized in previous notes.    Today Osvaldo is being evaluated for a routine review of multiple medical problems. Osvaldo has been more and more interactive per nursing report and during today's visit Osvaldo was able to have a conversation using his whiteboard. Osvaldo states he is doing good. He has been working with PT/OT and he feels it is going good. He is happy with his progress. He has no complaints or issues he would like to discuss. Osvaldo denies any other concerns including fevers/chills, cough or cold symptoms, headaches, vision changes, chest pain/pressure, difficulty breathing, SOB, abdominal pain, nausea, vomiting, diarrhea, dysuria, increasing weakness, increasing pain.     Past Medical History:   Diagnosis Date     Acute respiratory failure with  hypoxia (H)      Aphasia      Aspiration pneumonia (H)      CAD (coronary artery disease)      Cardiogenic shock (H)      CVA (cerebral vascular accident) (H)      Dysphagia      HLD (hyperlipidemia)      Hyperglycemia      Hypotension      Intracranial hemorrhage (H)      Late effects of cerebral ischemic stroke      Left hemiparesis (H)      Stroke (H)              Family History   Problem Relation Age of Onset     Deep vein thrombosis Brother      Stroke Maternal Grandfather      Social History     Socioeconomic History     Marital status: Single     Spouse name: None     Number of children: None     Years of education: None     Highest education level: None   Occupational History     None   Social Needs     Financial resource strain: None     Food insecurity:     Worry: None     Inability: None     Transportation needs:     Medical: None     Non-medical: None   Tobacco Use     Smoking status: Never Smoker     Smokeless tobacco: Never Used   Substance and Sexual Activity     Alcohol use: Yes     Drug use: None     Sexual activity: None   Lifestyle     Physical activity:     Days per week: None     Minutes per session: None     Stress: None   Relationships     Social connections:     Talks on phone: None     Gets together: None     Attends [employer] service: None     Active member of club or organization: None     Attends meetings of clubs or organizations: None     Relationship status: None     Intimate partner violence:     Fear of current or ex partner: None     Emotionally abused: None     Physically abused: None     Forced sexual activity: None   Other Topics Concern     None   Social History Narrative     None     Current Outpatient Medications   Medication Sig     acetaminophen (TYLENOL) 160 mg/5 mL solution 650 mg by G-tube route every 4 (four) hours as needed for fever or pain.            aspirin 81 mg chewable tablet 81 mg by G-tube route daily.     atorvastatin (LIPITOR) 40 MG tablet 40 mg by G-tube  route at bedtime.     baclofen (LIORESAL) 20 MG tablet 20 mg by G-tube route 3 (three) times a day.     bromocriptine (PARLODEL) 2.5 mg tablet 2.5 mg by G-tube route 2 (two) times a day.     chlorhexidine (PERIDEX) 0.12 % solution Apply 15 mL to the mouth or throat 2 (two) times a day.     citalopram (CELEXA) 10 MG tablet 10 mg by G-tube route daily.     donepezil (ARICEPT) 5 MG tablet 5 mg by G-tube route daily.     emollient base (EMOLLIENT TOP) Apply 2 g topically daily.     enoxaparin (LOVENOX) 40 mg/0.4 mL syringe Inject 40 mg under the skin daily.     heparin sodium,porcine (HEPARIN, PORCINE,) 5,000 unit/mL injection Inject 5,000 Units under the skin every 8 (eight) hours.     insulin glargine (LANTUS) 100 unit/mL injection Inject 8 Units under the skin at bedtime.     ipratropium-albuterol (DUO-NEB) 0.5-2.5 mg/3 mL nebulizer Inhale 3 mL 4 (four) times a day as needed.     ticagrelor (BRILINTA) 90 mg Tab 90 mg 2 (two) times a day. Via G-tube       REVIEW OF SYSTEM:  Per HPI.     PHYSICAL EXAM:   General appearance: alert, appears stated age and cooperative  HEENT: Head is normocephalic with normal hair distribution. No evidence of trauma. Ears: Without lesions or deformity. No acute purulent discharge. Eyes: Conjunctivae pink with no scleral icterus or erythema. Nose: Normal mucosa and septum. Oropharnyx: mmm, no lesions present.  Lungs: clear to auscultation bilaterally, respirations without effort  Heart: regular rate and rhythm, S1, S2 normal, no murmur, click, rub or gallop  Abdomen: soft, non-tender; bowel sounds normal; no masses,  no organomegaly, g-tube in place and infusing  Extremities: extremities normal, atraumatic, no cyanosis or edema  Pulses: 2+ and symmetric  Skin: Skin color, texture, turgor normal. No rashes or lesions  Neurologic: nonverbal, left sided hemiplegia, aphasia, immobile, will attempt to follow commands and is able to use right hand and foot, able to communicate well today with  white board.  Psych: pleasant      LABS:   None today.     ASSESSMENT:      ICD-10-CM    1. Physical deconditioning R53.81    2. Left hemiparesis (H) G81.94    3. Dysphagia, unspecified type R13.10    4. Aphasia R47.01    5. Cerebrovascular accident (CVA), unspecified mechanism (H) I63.9        PLAN:     No changes to care plan warranted. Care plan reviewed and remains appropriate. At this time plan is for patient to use all 100 days of therapy. Care conference planned for early next week to initiate further planning for discharge. Continue to improve and conversation is markedly improved since last visit.     Physical Deconditioning due to Cardiogenic Shock, Intracranial Hemorrhage, CVA- chronic, stable  -Continue PT/OT and other therapies as per care plan.  -Encouraged good nutrition and mobility as tolerated.  -Discussed care plan and expected course of stay.   -Continue to follow-up per routine schedule or sooner if needed.     CVA with Hemiparesis, Dysphagia, Aphasia- chronic, stable  -PT/OT as directed  -White board with dry erase marker for communication.   -Communication board.     Dysphagia- chronic, stable  -SLP to work with patient--consult, eval & treat.   -Tube feeding.    Otherwise continue current care plan for all other chronic medical conditions, as they are stable. Encouraged patient to engage in healthy lifestyle behaviors such as engaging in social activities, exercising (PT/OT), eating well, and following care plan. Follow up for routine check-up, or sooner if needed. Will continue to monitor patient and work with nursing staff collaboratively to work toward positive patient outcomes.    Electronically signed by: Heather Aj CNP

## 2022-09-09 NOTE — LETTER
Letter by Heather Aj CNP at      Author: Heather Aj CNP Service: -- Author Type: --    Filed:  Encounter Date: 3/12/2019 Status: (Other)         Patient: Osvaldo Rollins   MR Number: [id number]   YOB: 1960   Date of Visit: 3/12/2019     [facility]    Facility:   [facility] [[id number]]   Code Status: FULL CODE and POLST AVAILABLE      CHIEF COMPLAINT/REASON FOR VISIT:  Chief Complaint   Patient presents with   ? Review Of Multiple Medical Conditions     physical deconditioning, CVA, aphasia, dysphagia, HTN       HISTORY:      HPI: Osvaldo is a 58 y.o. male with complex past medical history including CVA, intracranial hemorrhage, aphasia, dysphagia, acute respiratory failure with hypoxia, CAD, cardiogenic shock, left hemiparesis (other's outlined in pmh table) presenting to [facility] for rehabilitation s/p CVA. He was hospitalized at [facility] on 11/1/2018 for a STEMI and L MCA occlusion, he was discharged on 11/21/2018 and went to [facility] where he was to undergo rehabilitative therapies. Discharging provider from [facility] summarized hospitalization in previous notes.  Has made some progress since transfer to [facility] on 11/21/18. Has had improvement to function of his RUE, is able to communicate via handwriting. Was initially on ventilator support, but gradually improved; eventually decannulated 12/18/18.  Hospital stay summarized in previous notes.    Today Osvaldo is being evaluated for a routine review of multiple medical problems. Osvaldo has been more and more interactive per nursing report and during today's visit Osvaldo was able to have a conversation using his whiteboard. Osvaldo states he is doing good. He has been working with PT/OT and he feels it is going good. He is happy with his progress. He has no complaints or issues he would like to discuss. Osvaldo denies any other concerns including fevers/chills, cough or  Problem: Patient/Family Goals  Goal: Patient/Family Long Term Goal  Description: Patient's Long Term Goal: To go home    Interventions:  - Monitor vital signs  - Frequent assessments  - Monitor I&Os  - RT treatments as ordered  - Give prescribed medications    - See additional Care Plan goals for specific interventions  9/9/2022 1439 by Erin Hoffman RN  Outcome: Completed  9/9/2022 1438 by Erin Hoffman RN  Outcome: Adequate for Discharge  Goal: Patient/Family Short Term Goal  Description: Patient's Short Term Goal: To breathe better    Interventions:   - Monitor vital signs  - Frequent assessments  - Monitor I&Os  - RT treatments as ordered  - Give prescribed medications    - See additional Care Plan goals for specific interventions  9/9/2022 1439 by Erin Hoffman RN  Outcome: Completed  9/9/2022 1438 by Erin Hoffman RN  Outcome: Adequate for Discharge     Problem: RESPIRATORY - PEDIATRIC  Goal: Achieves optimal ventilation and oxygenation  Description: INTERVENTIONS:  - Assess for changes in respiratory status  - Assess for changes in mentation and behavior  - Position to facilitate oxygenation and minimize respiratory effort  - Oxygen supplementation based on oxygen saturation or ABGs  - Provide Smoking Cessation handout, if applicable  - Encourage broncho-pulmonary hygiene including cough, deep breathe, Incentive Spirometry  - Assess the need for suctioning and perform as needed  - Assess and instruct to report SOB or any respiratory difficulty  - Respiratory Therapy support as indicated  - Manage/alleviate anxiety  - Monitor for signs/symptoms of CO2 retention  9/9/2022 1439 by Erin Hoffman RN  Outcome: Completed  9/9/2022 1438 by Erin Hoffman RN  Outcome: Adequate for Discharge     Problem: INFECTION - PEDIATRIC  Goal: Absence of infection during hospitalization  Description: INTERVENTIONS:  - Assess and monitor for signs and symptoms of infection  - Monitor lab/diagnostic cold symptoms, headaches, vision changes, chest pain/pressure, difficulty breathing, SOB, abdominal pain, nausea, vomiting, diarrhea, dysuria, increasing weakness, increasing pain.     Past Medical History:   Diagnosis Date   ? Acute respiratory failure with hypoxia (H)    ? Aphasia    ? Aspiration pneumonia (H)    ? CAD (coronary artery disease)    ? Cardiogenic shock (H)    ? CVA (cerebral vascular accident) (H)    ? Dysphagia    ? HLD (hyperlipidemia)    ? Hyperglycemia    ? Hypotension    ? Intracranial hemorrhage (H)    ? Late effects of cerebral ischemic stroke    ? Left hemiparesis (H)    ? Stroke (H)              Family History   Problem Relation Age of Onset   ? Deep vein thrombosis Brother    ? Stroke Maternal Grandfather      Social History     Socioeconomic History   ? Marital status: Single     Spouse name: None   ? Number of children: None   ? Years of education: None   ? Highest education level: None   Occupational History   ? None   Social Needs   ? Financial resource strain: None   ? Food insecurity:     Worry: None     Inability: None   ? Transportation needs:     Medical: None     Non-medical: None   Tobacco Use   ? Smoking status: Never Smoker   ? Smokeless tobacco: Never Used   Substance and Sexual Activity   ? Alcohol use: Yes   ? Drug use: None   ? Sexual activity: None   Lifestyle   ? Physical activity:     Days per week: None     Minutes per session: None   ? Stress: None   Relationships   ? Social connections:     Talks on phone: None     Gets together: None     Attends Voodoo service: None     Active member of club or organization: None     Attends meetings of clubs or organizations: None     Relationship status: None   ? Intimate partner violence:     Fear of current or ex partner: None     Emotionally abused: None     Physically abused: None     Forced sexual activity: None   Other Topics Concern   ? None   Social History Narrative   ? None     Current Outpatient Medications  results  - Monitor all insertion sites i.e., indwelling lines, tubes and drains  - Monitor endotracheal (as able) and nasal secretions for changes in amount and color  - [place] appropriate cooling/warming therapies per order  - Administer medications as ordered  - Instruct and encourage patient and family to use good hand hygiene technique  - Identify and instruct in appropriate isolation precautions for identified infection/condition  9/9/2022 1439 by Ayad Blackwell RN  Outcome: Completed  9/9/2022 1438 by Ayad Blackwell RN  Outcome: Adequate for Discharge  Goal: Absence of fever/infection during anticipated neutropenic period  Description: INTERVENTIONS  - Monitor WBC  - Administer growth factors as ordered  - Implement neutropenic guidelines  9/9/2022 1439 by Ayad Blackwell RN  Outcome: Completed  9/9/2022 1438 by Ayad Blackwell RN  Outcome: Adequate for Discharge     Problem: SAFETY PEDIATRIC - FALL  Goal: Free from fall injury  Description: INTERVENTIONS:  - Assess pt frequently for physical needs  - Identify cognitive and physical deficits and behaviors that affect risk of falls.   - [place] fall precautions as indicated by assessment.  - Educate pt/family on patient safety including physical limitations  - Instruct pt to call for assistance with activity based on assessment  - Modify environment to reduce risk of injury  - Provide assistive devices as appropriate  - Consider OT/PT consult to assist with strengthening/mobility  - Encourage toileting schedule  9/9/2022 1439 by Ayad Blackwell RN  Outcome: Completed  9/9/2022 1438 by Ayad Blackwell RN  Outcome: Adequate for Discharge   Medication Sig   ? acetaminophen (TYLENOL) 160 mg/5 mL solution 650 mg by G-tube route every 4 (four) hours as needed for fever or pain.          ? aspirin 81 mg chewable tablet 81 mg by G-tube route daily.   ? atorvastatin (LIPITOR) 40 MG tablet 40 mg by G-tube route at bedtime.   ? baclofen (LIORESAL) 20 MG tablet 20 mg by G-tube route 3 (three) times a day.   ? bromocriptine (PARLODEL) 2.5 mg tablet 2.5 mg by G-tube route 2 (two) times a day.   ? chlorhexidine (PERIDEX) 0.12 % solution Apply 15 mL to the mouth or throat 2 (two) times a day.   ? citalopram (CELEXA) 10 MG tablet 10 mg by G-tube route daily.   ? donepezil (ARICEPT) 5 MG tablet 5 mg by G-tube route daily.   ? emollient base (EMOLLIENT TOP) Apply 2 g topically daily.   ? enoxaparin (LOVENOX) 40 mg/0.4 mL syringe Inject 40 mg under the skin daily.   ? heparin sodium,porcine (HEPARIN, PORCINE,) 5,000 unit/mL injection Inject 5,000 Units under the skin every 8 (eight) hours.   ? insulin glargine (LANTUS) 100 unit/mL injection Inject 8 Units under the skin at bedtime.   ? ipratropium-albuterol (DUO-NEB) 0.5-2.5 mg/3 mL nebulizer Inhale 3 mL 4 (four) times a day as needed.   ? ticagrelor (BRILINTA) 90 mg Tab 90 mg 2 (two) times a day. Via G-tube       REVIEW OF SYSTEM:  Per HPI.     PHYSICAL EXAM:   General appearance: alert, appears stated age and cooperative  HEENT: Head is normocephalic with normal hair distribution. No evidence of trauma. Ears: Without lesions or deformity. No acute purulent discharge. Eyes: Conjunctivae pink with no scleral icterus or erythema. Nose: Normal mucosa and septum. Oropharnyx: mmm, no lesions present.  Lungs: clear to auscultation bilaterally, respirations without effort  Heart: regular rate and rhythm, S1, S2 normal, no murmur, click, rub or gallop  Abdomen: soft, non-tender; bowel sounds normal; no masses,  no organomegaly, g-tube in place and infusing  Extremities: extremities normal, atraumatic, no cyanosis or  edema  Pulses: 2+ and symmetric  Skin: Skin color, texture, turgor normal. No rashes or lesions  Neurologic: nonverbal, left sided hemiplegia, aphasia, immobile, will attempt to follow commands and is able to use right hand and foot, able to communicate well today with white board.  Psych: pleasant      LABS:   None today.     ASSESSMENT:      ICD-10-CM    1. Physical deconditioning R53.81    2. Left hemiparesis (H) G81.94    3. Dysphagia, unspecified type R13.10    4. Aphasia R47.01    5. Cerebrovascular accident (CVA), unspecified mechanism (H) I63.9        PLAN:     No changes to care plan warranted. Care plan reviewed and remains appropriate. At this time plan is for patient to use all 100 days of therapy. Care conference planned for early next week to initiate further planning for discharge. Continue to improve and conversation is markedly improved since last visit.     Physical Deconditioning due to Cardiogenic Shock, Intracranial Hemorrhage, CVA- chronic, stable  -Continue PT/OT and other therapies as per care plan.  -Encouraged good nutrition and mobility as tolerated.  -Discussed care plan and expected course of stay.   -Continue to follow-up per routine schedule or sooner if needed.     CVA with Hemiparesis, Dysphagia, Aphasia- chronic, stable  -PT/OT as directed  -White board with dry erase marker for communication.   -Communication board.     Dysphagia- chronic, stable  -SLP to work with patient--consult, eval & treat.   -Tube feeding.    Otherwise continue current care plan for all other chronic medical conditions, as they are stable. Encouraged patient to engage in healthy lifestyle behaviors such as engaging in social activities, exercising (PT/OT), eating well, and following care plan. Follow up for routine check-up, or sooner if needed. Will continue to monitor patient and work with nursing staff collaboratively to work toward positive patient outcomes.    Electronically signed by: Heather Aj  CNP

## 2023-01-18 NOTE — PROGRESS NOTES
[facility]    Facility:   [facility] [[id number]]     Code Status: FULL CODE      CHIEF COMPLAINT/REASON FOR VISIT:  Chief Complaint   Patient presents with     Review Of Multiple Medical Conditions     multiple CVAs       HISTORY:      HPI: Osvaldo is a 58 y.o. male with a history of stroke with left hemiparesis, intracranial hemorrhage, aphasia, dysphasia, acute respiratory failure, coronary artery disease with cardiogenic shock,.    He had had a stroke in 2011 with residual deficits, infero-lateral STEMI November 2018 with PCI x2, intra-aortic balloon pump for cardiogenic shock, and then a stroke during the cardiac procedure.  He had a thrombectomy of the left middle cerebral artery.  The had subsequent right middle cerebral artery stroke dense left hemiplegia.    He was at [facility].  He had some improvement in right upper extremity function, has some ability to write on a white board.N.p.o. with feedings per tube    Transferred to [facility]. Per Nursing and Social Service, family members wants to take him home and will take turns being with him.      Therapy has been working with him to roll side-to-side in the bed.  On 3/31 on the evening shift, he rolled his head and torso out of bed.  He was found with his head on the bed, his legs were still on the mattress.  He stated he was trying to rollover.    I discussed with her that when we order the hospital bed we could order more of a railing than the cane rail used in TCU.    Face-to-face was done last week for an electric hospital bed, and a standard manual wheelchair (see below)    UPDATE:  .  Therapy update today: He is assist of 2 with a standing lift.  Nurse was concerned about redness around the G-tube  He was to discharge in a couple days, the sister wants to wait until 4/16 for equipment tissues      Past Medical History:   Diagnosis Date     Acute respiratory failure with hypoxia (H)       Aphasia      Aspiration pneumonia (H)      CAD (coronary artery disease)      Cardiogenic shock (H)      CVA (cerebral vascular accident) (H)      Dysphagia      HLD (hyperlipidemia)      Hyperglycemia      Hypotension      Intracranial hemorrhage (H)      Late effects of cerebral ischemic stroke      Left hemiparesis (H)      Stroke (H)                    Review of Systems   Nonverbal, did smile.    Wrote a bit on the white board, however the writing was not in response to questions asked      Blood pressure 95/62, pulse 75, temperature 96.6  F (35.9  C), resp. rate 18, SpO2 95 %.            Physical Exam  Constitutional: alert,thin, middle-aged -American male nonverbal  Lungs: clear to auscultation bilaterally,   Heart: S1S2 regular rate and rhythm  Abdomen: soft, non-tender; bowel sounds normal; no masses,  no organomegaly, g-tube in place, site shows hypertrophic growth of tract lining tissue, easily treated with silver nitrate sticks, but not available. Abdominal wall without induration, warmth. No reaction to palpation around the tube.  Extremities: extremities normal, brace on left arm, no  edema  Skin:  No rashes   Neurologic: nonverbal, left sided hemiplegia, aphasia, drooling. Articulated brace on left arm.  Psych: pleasant    Per therapy update: Transfers with max assist of 2, maximum assistance in all ADLs.   He is not making significant progress.  100 th day will be on April 10. Family plans to take him to his brother's house and take care of him.    LABS:   No new labs    ASSESSMENT / PLAN :       ICD-10-CM    1. Cerebrovascular accident (CVA), unspecified mechanism (H) I63.9 Has plateaued. DC soon as 100th day on 4/10/19   2. Left hemiparesis (H) G81.94 Total assist 2 with ADLs, will be a challenge for family to give 24 hour care.  Baclofen   3. STEMI involving left circumflex coronary artery (H): TESSA I21.21 No sign of decompensation or anginal equivalent.  On Lipitor, Brilinta, aspirin   4.   dysphagia and aphasia post CVA R13.19 PEG site fineHELEN for nourishment       Electronically signed by: Lesley Schwarz MD      [Negative] : Genitourinary